# Patient Record
Sex: MALE | Race: BLACK OR AFRICAN AMERICAN | Employment: OTHER | URBAN - METROPOLITAN AREA
[De-identification: names, ages, dates, MRNs, and addresses within clinical notes are randomized per-mention and may not be internally consistent; named-entity substitution may affect disease eponyms.]

---

## 2020-07-04 ENCOUNTER — HOSPITAL ENCOUNTER (INPATIENT)
Age: 80
LOS: 9 days | Discharge: HOSPICE | DRG: 180 | End: 2020-07-13
Attending: INTERNAL MEDICINE | Admitting: INTERNAL MEDICINE
Payer: MEDICARE

## 2020-07-04 DIAGNOSIS — C34.92 METASTATIC PRIMARY LUNG CANCER, LEFT (HCC): ICD-10-CM

## 2020-07-04 DIAGNOSIS — R91.8 LUNG MASS: Primary | ICD-10-CM

## 2020-07-04 PROBLEM — J18.9 PNEUMONIA: Status: ACTIVE | Noted: 2020-07-04

## 2020-07-04 PROCEDURE — 65660000000 HC RM CCU STEPDOWN

## 2020-07-04 NOTE — PROGRESS NOTES
TRANSFER - IN REPORT:    Verbal report received from [facility] (name) on Luz Marina Fountain  being received from [address] [place] [address](unit) for routine progression of care      Report consisted of patients Situation, Background, Assessment and   Recommendations(SBAR). Information from the following report(s) SBAR was reviewed with the receiving nurse. Opportunity for questions and clarification was provided. Assessment completed upon patients arrival to unit and care assumed.

## 2020-07-05 LAB
ERYTHROCYTE [DISTWIDTH] IN BLOOD BY AUTOMATED COUNT: 16.7 % (ref 11.5–14.5)
HCT VFR BLD AUTO: 37.8 % (ref 36.6–50.3)
HGB BLD-MCNC: 11.5 G/DL (ref 12.1–17)
LACTATE SERPL-SCNC: 3 MMOL/L (ref 0.4–2)
MCH RBC QN AUTO: 26.6 PG (ref 26–34)
MCHC RBC AUTO-ENTMCNC: 30.4 G/DL (ref 30–36.5)
MCV RBC AUTO: 87.5 FL (ref 80–99)
NRBC # BLD: 0 K/UL (ref 0–0.01)
NRBC BLD-RTO: 0 PER 100 WBC
PLATELET # BLD AUTO: 197 K/UL (ref 150–400)
PMV BLD AUTO: 10.8 FL (ref 8.9–12.9)
RBC # BLD AUTO: 4.32 M/UL (ref 4.1–5.7)
TROPONIN I SERPL-MCNC: <0.05 NG/ML
WBC # BLD AUTO: 12.1 K/UL (ref 4.1–11.1)

## 2020-07-05 PROCEDURE — 74011250636 HC RX REV CODE- 250/636: Performed by: INTERNAL MEDICINE

## 2020-07-05 PROCEDURE — 74011000258 HC RX REV CODE- 258: Performed by: INTERNAL MEDICINE

## 2020-07-05 PROCEDURE — 74011250637 HC RX REV CODE- 250/637: Performed by: INTERNAL MEDICINE

## 2020-07-05 PROCEDURE — 77030029684 HC NEB SM VOL KT MONA -A

## 2020-07-05 PROCEDURE — 65660000000 HC RM CCU STEPDOWN

## 2020-07-05 PROCEDURE — 36415 COLL VENOUS BLD VENIPUNCTURE: CPT

## 2020-07-05 PROCEDURE — 83605 ASSAY OF LACTIC ACID: CPT

## 2020-07-05 PROCEDURE — 85027 COMPLETE CBC AUTOMATED: CPT

## 2020-07-05 PROCEDURE — 74011000250 HC RX REV CODE- 250: Performed by: INTERNAL MEDICINE

## 2020-07-05 PROCEDURE — 94640 AIRWAY INHALATION TREATMENT: CPT

## 2020-07-05 PROCEDURE — 84484 ASSAY OF TROPONIN QUANT: CPT

## 2020-07-05 RX ORDER — ALLOPURINOL 100 MG/1
100 TABLET ORAL 2 TIMES DAILY
Status: DISCONTINUED | OUTPATIENT
Start: 2020-07-05 | End: 2020-07-13 | Stop reason: HOSPADM

## 2020-07-05 RX ORDER — SODIUM CHLORIDE 0.9 % (FLUSH) 0.9 %
5-40 SYRINGE (ML) INJECTION EVERY 8 HOURS
Status: DISCONTINUED | OUTPATIENT
Start: 2020-07-05 | End: 2020-07-13 | Stop reason: HOSPADM

## 2020-07-05 RX ORDER — ONDANSETRON 2 MG/ML
4 INJECTION INTRAMUSCULAR; INTRAVENOUS
Status: DISCONTINUED | OUTPATIENT
Start: 2020-07-05 | End: 2020-07-13 | Stop reason: HOSPADM

## 2020-07-05 RX ORDER — MORPHINE SULFATE 2 MG/ML
1 INJECTION, SOLUTION INTRAMUSCULAR; INTRAVENOUS
Status: DISCONTINUED | OUTPATIENT
Start: 2020-07-05 | End: 2020-07-08

## 2020-07-05 RX ORDER — POLYETHYLENE GLYCOL 3350 17 G/17G
17 POWDER, FOR SOLUTION ORAL DAILY PRN
Status: DISCONTINUED | OUTPATIENT
Start: 2020-07-05 | End: 2020-07-13 | Stop reason: HOSPADM

## 2020-07-05 RX ORDER — COLCHICINE 0.6 MG/1
0.6 TABLET ORAL DAILY
Status: DISCONTINUED | OUTPATIENT
Start: 2020-07-05 | End: 2020-07-13 | Stop reason: HOSPADM

## 2020-07-05 RX ORDER — AMLODIPINE BESYLATE 5 MG/1
10 TABLET ORAL DAILY
Status: DISCONTINUED | OUTPATIENT
Start: 2020-07-05 | End: 2020-07-13 | Stop reason: HOSPADM

## 2020-07-05 RX ORDER — VANCOMYCIN 2 GRAM/500 ML IN 0.9 % SODIUM CHLORIDE INTRAVENOUS
2000
Status: COMPLETED | OUTPATIENT
Start: 2020-07-05 | End: 2020-07-05

## 2020-07-05 RX ORDER — FAMOTIDINE 10 MG/ML
20 INJECTION INTRAVENOUS EVERY 12 HOURS
Status: DISCONTINUED | OUTPATIENT
Start: 2020-07-05 | End: 2020-07-13 | Stop reason: HOSPADM

## 2020-07-05 RX ORDER — LISINOPRIL 20 MG/1
20 TABLET ORAL DAILY
Status: DISCONTINUED | OUTPATIENT
Start: 2020-07-05 | End: 2020-07-13 | Stop reason: HOSPADM

## 2020-07-05 RX ORDER — ENOXAPARIN SODIUM 100 MG/ML
40 INJECTION SUBCUTANEOUS DAILY
Status: DISCONTINUED | OUTPATIENT
Start: 2020-07-05 | End: 2020-07-08

## 2020-07-05 RX ORDER — ATENOLOL 25 MG/1
25 TABLET ORAL DAILY
Status: DISCONTINUED | OUTPATIENT
Start: 2020-07-05 | End: 2020-07-13 | Stop reason: HOSPADM

## 2020-07-05 RX ORDER — ATORVASTATIN CALCIUM 10 MG/1
10 TABLET, FILM COATED ORAL DAILY
Status: DISCONTINUED | OUTPATIENT
Start: 2020-07-05 | End: 2020-07-13 | Stop reason: HOSPADM

## 2020-07-05 RX ORDER — ACETAMINOPHEN 325 MG/1
650 TABLET ORAL
Status: DISCONTINUED | OUTPATIENT
Start: 2020-07-05 | End: 2020-07-13 | Stop reason: HOSPADM

## 2020-07-05 RX ORDER — PROMETHAZINE HYDROCHLORIDE 25 MG/1
12.5 TABLET ORAL
Status: DISCONTINUED | OUTPATIENT
Start: 2020-07-05 | End: 2020-07-13 | Stop reason: HOSPADM

## 2020-07-05 RX ORDER — SODIUM CHLORIDE 0.9 % (FLUSH) 0.9 %
5-40 SYRINGE (ML) INJECTION AS NEEDED
Status: DISCONTINUED | OUTPATIENT
Start: 2020-07-05 | End: 2020-07-13 | Stop reason: HOSPADM

## 2020-07-05 RX ORDER — SODIUM CHLORIDE 9 MG/ML
50 INJECTION, SOLUTION INTRAVENOUS CONTINUOUS
Status: DISCONTINUED | OUTPATIENT
Start: 2020-07-05 | End: 2020-07-07

## 2020-07-05 RX ORDER — VANCOMYCIN/0.9 % SOD CHLORIDE 1.5G/250ML
1500 PLASTIC BAG, INJECTION (ML) INTRAVENOUS
Status: DISCONTINUED | OUTPATIENT
Start: 2020-07-05 | End: 2020-07-05

## 2020-07-05 RX ORDER — ACETAMINOPHEN 650 MG/1
650 SUPPOSITORY RECTAL
Status: DISCONTINUED | OUTPATIENT
Start: 2020-07-05 | End: 2020-07-13 | Stop reason: HOSPADM

## 2020-07-05 RX ADMIN — Medication 10 ML: at 16:02

## 2020-07-05 RX ADMIN — VANCOMYCIN HYDROCHLORIDE 2000 MG: 10 INJECTION, POWDER, LYOPHILIZED, FOR SOLUTION INTRAVENOUS at 02:52

## 2020-07-05 RX ADMIN — Medication 10 ML: at 05:05

## 2020-07-05 RX ADMIN — PIPERACILLIN AND TAZOBACTAM 3.38 G: 3; .375 INJECTION, POWDER, LYOPHILIZED, FOR SOLUTION INTRAVENOUS at 17:06

## 2020-07-05 RX ADMIN — AMLODIPINE BESYLATE 10 MG: 5 TABLET ORAL at 11:50

## 2020-07-05 RX ADMIN — MORPHINE SULFATE 1 MG: 2 INJECTION, SOLUTION INTRAMUSCULAR; INTRAVENOUS at 14:13

## 2020-07-05 RX ADMIN — LISINOPRIL 20 MG: 20 TABLET ORAL at 11:50

## 2020-07-05 RX ADMIN — ENOXAPARIN SODIUM 40 MG: 30 INJECTION SUBCUTANEOUS at 08:26

## 2020-07-05 RX ADMIN — ACETAMINOPHEN 650 MG: 325 TABLET ORAL at 08:27

## 2020-07-05 RX ADMIN — ALLOPURINOL 100 MG: 100 TABLET ORAL at 17:06

## 2020-07-05 RX ADMIN — Medication 10 ML: at 21:12

## 2020-07-05 RX ADMIN — PIPERACILLIN AND TAZOBACTAM 3.38 G: 3; .375 INJECTION, POWDER, LYOPHILIZED, FOR SOLUTION INTRAVENOUS at 10:20

## 2020-07-05 RX ADMIN — ATORVASTATIN CALCIUM 10 MG: 10 TABLET, FILM COATED ORAL at 11:49

## 2020-07-05 RX ADMIN — ATENOLOL 25 MG: 25 TABLET ORAL at 11:50

## 2020-07-05 RX ADMIN — PIPERACILLIN AND TAZOBACTAM 3.38 G: 3; .375 INJECTION, POWDER, LYOPHILIZED, FOR SOLUTION INTRAVENOUS at 05:01

## 2020-07-05 RX ADMIN — FAMOTIDINE 20 MG: 10 INJECTION INTRAVENOUS at 21:12

## 2020-07-05 RX ADMIN — COLCHICINE 0.6 MG: 0.6 TABLET ORAL at 11:50

## 2020-07-05 RX ADMIN — METHYLPREDNISOLONE SODIUM SUCCINATE 40 MG: 40 INJECTION, POWDER, FOR SOLUTION INTRAMUSCULAR; INTRAVENOUS at 21:12

## 2020-07-05 RX ADMIN — METHYLPREDNISOLONE SODIUM SUCCINATE 40 MG: 40 INJECTION, POWDER, FOR SOLUTION INTRAMUSCULAR; INTRAVENOUS at 11:50

## 2020-07-05 RX ADMIN — Medication 10 ML: at 01:32

## 2020-07-05 RX ADMIN — ALLOPURINOL 100 MG: 100 TABLET ORAL at 11:50

## 2020-07-05 RX ADMIN — FAMOTIDINE 20 MG: 10 INJECTION INTRAVENOUS at 08:27

## 2020-07-05 RX ADMIN — SODIUM CHLORIDE 50 ML/HR: 900 INJECTION, SOLUTION INTRAVENOUS at 11:00

## 2020-07-05 RX ADMIN — ACETAMINOPHEN 650 MG: 325 TABLET ORAL at 01:29

## 2020-07-05 RX ADMIN — ARFORMOTEROL TARTRATE: 15 SOLUTION RESPIRATORY (INHALATION) at 19:15

## 2020-07-05 NOTE — H&P
Hospitalist Admission Note    NAME: Jonathan Nagel   :  1940   MRN:  [id number]     Date/Time:  2020 11:11 PM    Patient PCP: Tawanna Costello  ______________________________________________________________________  Given the patient's current clinical presentation, I have a high level of concern for decompensation if discharged from the emergency department. Complex decision making was performed, which includes reviewing the patient's available past medical records, laboratory results, and x-ray films. My assessment of this patient's clinical condition and my plan of care is as follows.     Assessment / Plan:  Worsening shortness of breath secondary to left pleural effusion and left lung mass and superimposed pneumonia   Admit patient to stepdown  Oxygen supplement  start patient on IV antibiotic Zosyn and vancomycin   IR consultation for thoracocentesis  Patient stated he has lung biopsy follow-up pathology report    Hyperlipidemia   Continue Lipitor    Hypertension  continue home antihypertensive medication    CAD  continue aspirin    History of emphysema  Nebulizer as needed          Code Status: Full  Surrogate Decision Maker:    DVT Prophylaxis: Lovenox   GI Prophylaxis: not indicated          Subjective:   CHIEF COMPLAINT: SOB     HISTORY OF PRESENT ILLNESS:     79years old male with past medical history significant for emphysema, hypertension, coronary artery disease was transferred from [facility] ED for evaluation of worsening shortness of breath, patient was recently diagnosed with lung mass and had lung biopsy recently, patient stated his shortness of breath worsening and he feels shortness of breath with minimal exertion, patient denies any fever any chills denies any chest pain, blood work in ED was significant for elevated white blood cell count 13.0, lactic acid was noticed to be elevated 3.6, CTA chest was done and show an increase large left chest lung mass and increased left pleural effusion. We were asked to admit for work up and evaluation of the above problems. past medical history on file. Emphysema , HTN      past surgical history on file. Lung biopsy     Social History     Tobacco Use    Smoking status: Not on file   Substance Use Topics    Alcohol use: Not on file         family history on file. HTN   Allergies   Allergen Reactions    Bupropion Not Reported This Time        Prior to Admission medications    Medication Sig Start Date End Date Taking? Authorizing Provider   albuterol (Ventolin HFA) 90 mcg/actuation inhaler Take 2 Puffs by inhalation every four (4) hours as needed. 9/6/19 9/5/20  Other, MD Roberto   allopurinoL (ZYLOPRIM) 100 mg tablet Take 100 mg by mouth two (2) times a day. 9/6/19   Roberto Siegel MD   amLODIPine (NORVASC) 10 mg tablet TAKE 1 TABLET EVERY DAY 11/6/19   Roberto Siegel MD   aspirin-calcium carbonate 81 mg-300 mg calcium(777 mg) tab Take 81 mg by mouth daily. 4/17/17   Roberto Siegel MD   atenoloL-chlorthalidone (TENORETIC) 100-25 mg per tablet Take 1 Tab by mouth daily. 9/6/19 9/5/20  Roberto Siegel MD   atorvastatin (LIPITOR) 10 mg tablet Take 10 mg by mouth daily. 9/6/19   Roberto Siegel MD   Bifidobacterium Infantis (Align) 4 mg cap Take 1 Cap by mouth. 5/29/20   Roberto Siegel MD   budesonide-formoteroL (SYMBICORT) 160-4.5 mcg/actuation HFAA Take 2 Puffs by inhalation two (2) times a day. 5/22/20 5/22/21  Roberto Siegel MD   colchicine 0.6 mg tablet TAKE 1 TABLET EVERY DAY 11/6/19   Roberto Siegel MD   diclofenac EC (VOLTAREN) 75 mg EC tablet Take 75 mg by mouth two (2) times daily as needed. 6/12/20 8/11/20  Roberto Siegel MD   docusate sodium (COLACE) 100 mg capsule Take 100 mg by mouth two (2) times a day. 4/22/20 4/22/21  Roberto Siegel MD   escitalopram oxalate (LEXAPRO) 10 mg tablet TAKE 1 TABLET EVERY DAY 11/6/19   Roberto Siegel MD   albuterol-ipratropium (DUO-NEB) 2.5 mg-0.5 mg/3 ml nebu Take 3 mL by inhalation.  5/22/20 5/22/21 Other, MD Roberto   lisinopriL (PRINIVIL, ZESTRIL) 20 mg tablet TAKE 1 TABLET EVERY DAY 11/6/19   Roberto Siegel MD   nitroglycerin (NITROSTAT) 0.4 mg SL tablet DISSOLVE 1 TABLET UNDER THE TONGUE AS NEEDED 10/17/19   Roberto Siegel MD   omega-3 acid ethyl esters (LOVAZA) 1 gram capsule Take 2 g by mouth two (2) times a day. 12/7/18   Roberto Siegel MD   potassium chloride SA (MICRO-K) 10 mEq capsule Take 20 mEq by mouth daily. 9/6/19 9/5/20  Roberto Siegel MD   tadalafiL (Cialis) 10 mg tablet Take 10 mg by mouth as needed. 3/30/17   Roberto Siegel MD   tiotropium (SPIRIVA) 18 mcg inhalation capsule Take 1 Cap by inhalation daily. 6/3/20 6/3/21  Roberto Siegel MD       REVIEW OF SYSTEMS:     I am not able to complete the review of systems because:    The patient is intubated and sedated    The patient has altered mental status due to his acute medical problems    The patient has baseline aphasia from prior stroke(s)    The patient has baseline dementia and is not reliable historian    The patient is in acute medical distress and unable to provide information           Total of 12 systems reviewed as follows:       POSITIVE= underlined text  Negative = text not underlined  General:  fever, chills, sweats, generalized weakness, weight loss/gain,      loss of appetite   Eyes:    blurred vision, eye pain, loss of vision, double vision  ENT:    rhinorrhea, pharyngitis   Respiratory:   cough, sputum production, SOB, HORN, wheezing, pleuritic pain   Cardiology:   chest pain, palpitations, orthopnea, PND, edema, syncope   Gastrointestinal:  abdominal pain , N/V, diarrhea, dysphagia, constipation, bleeding   Genitourinary:  frequency, urgency, dysuria, hematuria, incontinence   Muskuloskeletal :  arthralgia, myalgia, back pain  Hematology:  easy bruising, nose or gum bleeding, lymphadenopathy   Dermatological: rash, ulceration, pruritis, color change / jaundice  Endocrine:   hot flashes or polydipsia   Neurological:  headache, dizziness, confusion, focal weakness, paresthesia,     Speech difficulties, memory loss, gait difficulty  Psychological: Feelings of anxiety, depression, agitation    Objective:   VITALS:    Visit Vitals  /81 (BP 1 Location: Left arm, BP Patient Position: At rest)   Pulse 99   Temp 97.3 °F (36.3 °C)   Resp 20   Wt 90.7 kg (199 lb 15.3 oz)   SpO2 93%       PHYSICAL EXAM:    General:    Alert, cooperative, no distress, appears stated age. HEENT: Atraumatic, anicteric sclerae, pink conjunctivae     No oral ulcers, mucosa moist, throat clear, dentition fair  Neck:  Supple, symmetrical,  thyroid: non tender  Lungs:   Diminished breath sound   Chest wall:  No tenderness  No Accessory muscle use. Heart:   Regular  rhythm,  No  murmur   No edema  Abdomen:   Soft, non-tender. Not distended. Bowel sounds normal  Extremities: No cyanosis. No clubbing,      Skin turgor normal, Capillary refill normal, Radial dial pulse 2+  Skin:     Not pale. Not Jaundiced  No rashes   Psych:  Good insight. Not depressed. Not anxious or agitated. Neurologic: EOMs intact. No facial asymmetry. No aphasia or slurred speech. Symmetrical strength, Sensation grossly intact.  Alert and oriented X 4.     _______________________________________________________________________  Care Plan discussed with:    Comments   Patient y    Family      RN y    Care Manager                    Consultant:      _______________________________________________________________________  Expected  Disposition:   Home with Family y   HH/PT/OT/RN    SNF/LTC    IVELKA    ________________________________________________________________________  TOTAL TIME:  61  Minutes    Critical Care Provided     Minutes non procedure based      Comments    y Reviewed previous records   >50% of visit spent in counseling and coordination of care y Discussion with patient and/or family and questions answered ________________________________________________________________________  Signed: Satinder Laureano MD    Procedures: see electronic medical records for all procedures/Xrays and details which were not copied into this note but were reviewed prior to creation of Plan. LAB DATA REVIEWED:    Recent Results (from the past 24 hour(s))   EKG, 12 LEAD, INITIAL    Collection Time: 07/04/20  2:59 PM   Result Value Ref Range    Ventricular Rate 115 BPM    Atrial Rate 115 BPM    P-R Interval 128 ms    QRS Duration 162 ms    Q-T Interval 420 ms    QTC Calculation (Bezet) 581 ms    Calculated P Axis 71 degrees    Calculated R Axis -102 degrees    Calculated T Axis 72 degrees    Diagnosis       Atrial-sensed ventricular-paced rhythm  Abnormal ECG  No previous ECGs available  Confirmed by Machelle Retana MD, --- ([id number]) on 7/4/2020 5:44:39 PM     CBC WITH AUTOMATED DIFF    Collection Time: 07/04/20  3:45 PM   Result Value Ref Range    WBC 13.0 (H) 4.1 - 11.1 K/uL    RBC 4.29 4. 10 - 5.70 M/uL    HGB 11.6 (L) 12.1 - 17.0 g/dL    HCT 36.8 36.6 - 50.3 %    MCV 85.8 80.0 - 99.0 FL    MCH 27.0 26.0 - 34.0 PG    MCHC 31.5 30.0 - 36.5 g/dL    RDW 16.4 (H) 11.5 - 14.5 %    PLATELET 556 489 - 323 K/uL    MPV 10.7 8.9 - 12.9 FL    NRBC 0.0 0  WBC    ABSOLUTE NRBC 0.00 0.00 - 0.01 K/uL    NEUTROPHILS 76 (H) 32 - 75 %    LYMPHOCYTES 10 (L) 12 - 49 %    MONOCYTES 11 5 - 13 %    EOSINOPHILS 2 0 - 7 %    BASOPHILS 0 0 - 1 %    IMMATURE GRANULOCYTES 1 (H) 0.0 - 0.5 %    ABS. NEUTROPHILS 9.9 (H) 1.8 - 8.0 K/UL    ABS. LYMPHOCYTES 1.3 0.8 - 3.5 K/UL    ABS. MONOCYTES 1.4 (H) 0.0 - 1.0 K/UL    ABS. EOSINOPHILS 0.3 0.0 - 0.4 K/UL    ABS. BASOPHILS 0.1 0.0 - 0.1 K/UL    ABS. IMM.  GRANS. 0.1 (H) 0.00 - 0.04 K/UL    DF AUTOMATED     PROTHROMBIN TIME + INR    Collection Time: 07/04/20  3:45 PM   Result Value Ref Range    INR 1.3 (H) 0.9 - 1.1      Prothrombin time 12.4 (H) 9.0 - 24.4 sec   METABOLIC PANEL, COMPREHENSIVE    Collection Time: 07/04/20  3:45 PM   Result Value Ref Range    Sodium 142 136 - 145 mmol/L    Potassium 5.1 3.5 - 5.1 mmol/L    Chloride 106 97 - 108 mmol/L    CO2 26 21 - 32 mmol/L    Anion gap 10 5 - 15 mmol/L    Glucose 114 (H) 65 - 100 mg/dL    BUN 18 6 - 20 MG/DL    Creatinine 1.14 0.70 - 1.30 MG/DL    BUN/Creatinine ratio 16 12 - 20      GFR est AA >60 >60 ml/min/1.73m2    GFR est non-AA >60 >60 ml/min/1.73m2    Calcium 10.3 (H) 8.5 - 10.1 MG/DL    Bilirubin, total 0.4 0.2 - 1.0 MG/DL    ALT (SGPT) 19 12 - 78 U/L    AST (SGOT) 27 15 - 37 U/L    Alk. phosphatase 88 45 - 117 U/L    Protein, total 6.9 6.4 - 8.2 g/dL    Albumin 2.1 (L) 3.5 - 5.0 g/dL    Globulin 4.8 (H) 2.0 - 4.0 g/dL    A-G Ratio 0.4 (L) 1.1 - 2.2     LIPASE    Collection Time: 07/04/20  3:45 PM   Result Value Ref Range    Lipase 39 (L) 73 - 393 U/L   TROPONIN I    Collection Time: 07/04/20  3:45 PM   Result Value Ref Range    Troponin-I, Qt. <0.05 <0.05 ng/mL   LACTIC ACID    Collection Time: 07/04/20  3:45 PM   Result Value Ref Range    Lactic acid 3.6 (HH) 0.4 - 2.0 MMOL/L   SAMPLES BEING HELD    Collection Time: 07/04/20  3:45 PM   Result Value Ref Range    SAMPLES BEING HELD 1SST, 1RED     COMMENT        Add-on orders for these samples will be processed based on acceptable specimen integrity and analyte stability, which may vary by analyte.    LACTIC ACID    Collection Time: 07/04/20  6:01 PM   Result Value Ref Range    Lactic acid 2.8 (HH) 0.4 - 2.0 MMOL/L   URINALYSIS W/ RFLX MICROSCOPIC    Collection Time: 07/04/20  7:58 PM   Result Value Ref Range    Color YELLOW/STRAW      Appearance CLEAR CLEAR      Specific gravity 1.015 1.003 - 1.030      pH (UA) 5.0 5.0 - 8.0      Protein Negative NEG mg/dL    Glucose Negative NEG mg/dL    Ketone Negative NEG mg/dL    Bilirubin Negative NEG      Blood Negative NEG      Urobilinogen 0.2 0.2 - 1.0 EU/dL    Nitrites Negative NEG      Leukocyte Esterase Negative NEG

## 2020-07-05 NOTE — PROGRESS NOTES
Pharmacy Automatic Renal Dosing Protocol - Antimicrobials    Indication for Antimicrobials: VAP     Current Regimen of Each Antimicrobial:  Zosyn 3.375 gram iv q8h,  Vancomycin pharmacy to dose,      Previous Antimicrobial Therapy:      Goal Level: VANCOMYCIN TROUGH GOAL RANGE    Vancomycin Trough: 15 - 20 mcg/mL  (AUC: 400 - 600 mg/hr/Liter/day)     Date Dose & Interval Measured (mcg/mL) Extrapolated (mcg/mL)                       Date & time of next level:     Significant Cultures: Urine cultures ,  Blood cultures       Radiology / Imaging results: (X-ray, CT scan or MRI):     Paralysis, amputations, malnutrition:     Labs:  Recent Labs     20  0140 20  1545   CREA  --  1.14   BUN  --  18   WBC 12.1* 13.0*     Temp (24hrs), Av.9 °F (36.6 °C), Min:97.3 °F (36.3 °C), Max:98.4 °F (36.9 °C)    Creatinine Clearance (mL/min) or Dialysis: 61.1    Impression/Plan:   Zosyn dosing appropriate,  Vancomycin dosing started at 2000 mg now and then 1500 mg iv q16h to achieve an estimated peak/trough of 39.2 and 17.11. Antimicrobial stop date      Pharmacy will follow daily and adjust medications as appropriate for renal function and/or serum levels. Thank you,  Kaden Aguilera, PHARMD    Recommended duration of therapy  [email or web address]/Pharmacy/Clinical%20Companion/[email or web address]. docx    Renal Dosing  [email or web address]/[place]/[facility]/Pharmacy/Clinical%20Companion/Renal%[email or web address]. pdf

## 2020-07-05 NOTE — ROUTINE PROCESS
ADULT PROTOCOL: JET AEROSOL ASSESSMENT Patient  Yolanda Whiting     79 y.o.   male     7/5/2020  7:35 PM 
 
Breath Sounds Pre Procedure: Right Breath Sounds: Diminished Left Breath Sounds: Diminished Breath Sounds Post Procedure: Right Breath Sounds: Diminished Left Breath Sounds: Diminished Breathing pattern: Pre procedure Breathing Pattern: Regular Post procedure Breathing Pattern: Regular Heart Rate: Pre procedure Pulse: 79 Post procedure Pulse: 82 Resp Rate: Pre procedure Respirations: 18 Post procedure Respirations: 18 
 
Cough: Pre procedure Cough: Non-productive Post procedure Cough: Non-productive Suctioned: NO Oxygen: O2 Device: Room air Changed: NO SpO2: Pre procedure SpO2: 93 %   without oxygen Post procedure SpO2: 94 %  without oxygen Nebulizer Therapy: Current medications Aerosolized Medications: Brovana, Pulmicort Changed: NO Smoking History: not on file. Problem List:  
Patient Active Problem List  
Diagnosis Code  Lung mass R91.8  Pneumonia J18.9 Respiratory Therapist: RT Uriel

## 2020-07-05 NOTE — PROGRESS NOTES
Patient arrive via EMS from [facility]. Alert and oriented x3. VSS. Awaing on MD to see patient.     German Serrano RN

## 2020-07-05 NOTE — ACP (ADVANCE CARE PLANNING)
.                                           Advance Care Planning Note      NAME: Quinton Ram   :  1940   MRN:  [id number]     Date/Time:  2020 10:25 AM    Active Diagnoses:  Hospital Problems  Never Reviewed          Codes Class Noted POA    Lung mass ICD-10-CM: R91.8  ICD-9-CM: 786.6  2020 Unknown        Pneumonia ICD-10-CM: J18.9  ICD-9-CM: 626  2020 Unknown              These active diagnoses are of sufficient risk that focused discussion on advance care planning is indicated in order to allow the patient to thoughtfully consider personal goals of care, and if situations arise that prevent the ability to personally give input, to ensure appropriate representation of their personal desires for different levels and aggressiveness of care. Discussion:   Code status addressed and wants to be a DNR / DNI. Patient wants central line and vasopressors if needed. Patient  would like to assign [facility] [place] [place] Grace(friend, caregiver/)  as the surrogate decision maker. Persons present and participating in discussion: Indiana Duran MD, Discussed with [facility] [place] [place] over phone      Time Spent:   Total time spent face-to-face in education and discussion:   17  minutes.          Indiana Mckeon MD   Hospitalist

## 2020-07-05 NOTE — PROGRESS NOTES
Hospitalist Progress Note    NAME: Chrystal Rasmussen   :  1940   MRN:  [id number]       Assessment / Plan:  Malignant left pleural effusion POA  Left lung mass 17x15 cm  Concern for left CAP    -Recently diagnosed high-grade malignant neoplasm   -CT chest on admission, showed slightly increase in size of left lung mass, invades the mediastinum and narrows the pulmonary artery on the left and it displaces the mediastinum to the right.  -His CT chest was evaluated by cardiothoracic surgeon Dr. Joaquin Nunez, no surgical resection recommended due to extensive involvement  -Has a first appointment with hematology on .    -Patient admitted with worsening of shortness of breath, fatigue.    -Plan for IR guided thoracentesis tomorrow  -Continue current antibiotics, doubt pneumonia  -Nebulizer  -Low-dose of steroids  -Oxygen supplement if needed    Hypercalcemia, Mild  Corrected calcium 11.8  Hold Chlorthalidone  gentle hydration, repeat tomorrow        History of CAD status post CABG     Hyperlipidemia   Continue Lipitor     Hypertension  continue home antihypertensive medication     CAD  continue aspirin     History of emphysema  Nebulizer as needed      Pt/OT- may need rehab        Code Status:  DNR  Surrogate Decision Maker: Friend/caregiver/ Maureen Carrizales-  -discussed with patient, he states that he lives with her for past 30 years, and he wants to appoint her as a mPOA       DVT Prophylaxis: Lovenox   GI Prophylaxis: not indicated        Subjective:     Chief Complaint / Reason for Physician Visit  \"feels same as yesterday\". Discussed with RN events overnight.      Review of Systems:  Symptom Y/N Comments  Symptom Y/N Comments   Fever/Chills n   Chest Pain n    Poor Appetite n   Edema n    Cough y   Abdominal Pain y    Sputum n   Joint Pain     SOB/HORN y   Pruritis/Rash     Nausea/vomit n   Tolerating PT/OT     Diarrhea    Tolerating Diet     Constipation    Other       Could NOT obtain due to:      Objective:     VITALS:   Last 24hrs VS reviewed since prior progress note. Most recent are:  Patient Vitals for the past 24 hrs:   Temp Pulse Resp BP SpO2   07/05/20 0754 97.8 °F (36.6 °C) 97 16 127/87 92 %   07/05/20 0401 97.6 °F (36.4 °C) 99 20 108/75 94 %   07/04/20 2155 97.3 °F (36.3 °C) 99 20 135/81 93 %       Intake/Output Summary (Last 24 hours) at 7/5/2020 5387  Last data filed at 7/4/2020 2346  Gross per 24 hour   Intake    Output 200 ml   Net -200 ml        PHYSICAL EXAM:  General: WD, WN. Alert, cooperative, no acute distress    EENT:  EOMI. Anicteric sclerae. MMM  Resp:  B/l poor air entry  CV:  Regular  rhythm,  No edema  GI:  Soft, Non distended, Non tender.  +Bowel sounds  Neurologic:  Alert and oriented X 3, normal speech,   Psych:   Good insight. Not anxious nor agitated  Skin:  No rashes. No jaundice    Reviewed most current lab test results and cultures  YES  Reviewed most current radiology test results   YES  Review and summation of old records today    NO  Reviewed patient's current orders and MAR    YES  PMH/SH reviewed - no change compared to H&P  ________________________________________________________________________  Care Plan discussed with:    Comments   Patient x    Family      RN x    Care Manager     Consultant                        Multidiciplinary team rounds were held today with , nursing, pharmacist and clinical coordinator. Patient's plan of care was discussed; medications were reviewed and discharge planning was addressed.      ________________________________________________________________________  Total NON critical care TIME:  30   Minutes    Total CRITICAL CARE TIME Spent:   Minutes non procedure based      Comments   >50% of visit spent in counseling and coordination of care     ________________________________________________________________________  Faythe Frankel, MD     Procedures: see electronic medical records for all procedures/Xrays and details which were not copied into this note but were reviewed prior to creation of Plan. LABS:  I reviewed today's most current labs and imaging studies.   Pertinent labs include:  Recent Labs     07/05/20  0140 07/04/20  1545   WBC 12.1* 13.0*   HGB 11.5* 11.6*   HCT 37.8 36.8    211     Recent Labs     07/04/20  1545      K 5.1      CO2 26   *   BUN 18   CREA 1.14   CA 10.3*   ALB 2.1*   TBILI 0.4   ALT 19   INR 1.3*       Signed: Yesica Antonio MD

## 2020-07-06 ENCOUNTER — APPOINTMENT (OUTPATIENT)
Dept: GENERAL RADIOLOGY | Age: 80
DRG: 180 | End: 2020-07-06
Attending: RADIOLOGY
Payer: MEDICARE

## 2020-07-06 ENCOUNTER — APPOINTMENT (OUTPATIENT)
Dept: ULTRASOUND IMAGING | Age: 80
DRG: 180 | End: 2020-07-06
Attending: INTERNAL MEDICINE
Payer: MEDICARE

## 2020-07-06 LAB
ALBUMIN SERPL-MCNC: 2 G/DL (ref 3.5–5)
ALBUMIN/GLOB SERPL: 0.4 {RATIO} (ref 1.1–2.2)
ALP SERPL-CCNC: 90 U/L (ref 45–117)
ALT SERPL-CCNC: 16 U/L (ref 12–78)
ANION GAP SERPL CALC-SCNC: 7 MMOL/L (ref 5–15)
AST SERPL-CCNC: 19 U/L (ref 15–37)
BILIRUB DIRECT SERPL-MCNC: 0.2 MG/DL (ref 0–0.2)
BILIRUB SERPL-MCNC: 0.7 MG/DL (ref 0.2–1)
BUN SERPL-MCNC: 17 MG/DL (ref 6–20)
BUN/CREAT SERPL: 18 (ref 12–20)
CALCIUM SERPL-MCNC: 10 MG/DL (ref 8.5–10.1)
CHLORIDE SERPL-SCNC: 109 MMOL/L (ref 97–108)
CO2 SERPL-SCNC: 23 MMOL/L (ref 21–32)
CREAT SERPL-MCNC: 0.97 MG/DL (ref 0.7–1.3)
ERYTHROCYTE [DISTWIDTH] IN BLOOD BY AUTOMATED COUNT: 16.7 % (ref 11.5–14.5)
GLOBULIN SER CALC-MCNC: 4.9 G/DL (ref 2–4)
GLUCOSE SERPL-MCNC: 116 MG/DL (ref 65–100)
HCT VFR BLD AUTO: 40.1 % (ref 36.6–50.3)
HGB BLD-MCNC: 12 G/DL (ref 12.1–17)
LACTATE SERPL-SCNC: 4.2 MMOL/L (ref 0.4–2)
LACTATE SERPL-SCNC: 4.3 MMOL/L (ref 0.4–2)
MCH RBC QN AUTO: 26.6 PG (ref 26–34)
MCHC RBC AUTO-ENTMCNC: 29.9 G/DL (ref 30–36.5)
MCV RBC AUTO: 88.9 FL (ref 80–99)
NRBC # BLD: 0 K/UL (ref 0–0.01)
NRBC BLD-RTO: 0 PER 100 WBC
PLATELET # BLD AUTO: 208 K/UL (ref 150–400)
PMV BLD AUTO: 11.2 FL (ref 8.9–12.9)
POTASSIUM SERPL-SCNC: 5.1 MMOL/L (ref 3.5–5.1)
PROCALCITONIN SERPL-MCNC: 0.29 NG/ML
PROT SERPL-MCNC: 6.9 G/DL (ref 6.4–8.2)
RBC # BLD AUTO: 4.51 M/UL (ref 4.1–5.7)
SODIUM SERPL-SCNC: 139 MMOL/L (ref 136–145)
WBC # BLD AUTO: 14 K/UL (ref 4.1–11.1)

## 2020-07-06 PROCEDURE — 97165 OT EVAL LOW COMPLEX 30 MIN: CPT | Performed by: OCCUPATIONAL THERAPIST

## 2020-07-06 PROCEDURE — 74011250636 HC RX REV CODE- 250/636: Performed by: INTERNAL MEDICINE

## 2020-07-06 PROCEDURE — 83605 ASSAY OF LACTIC ACID: CPT

## 2020-07-06 PROCEDURE — 65660000000 HC RM CCU STEPDOWN

## 2020-07-06 PROCEDURE — 94640 AIRWAY INHALATION TREATMENT: CPT

## 2020-07-06 PROCEDURE — 71045 X-RAY EXAM CHEST 1 VIEW: CPT

## 2020-07-06 PROCEDURE — 74011250637 HC RX REV CODE- 250/637: Performed by: INTERNAL MEDICINE

## 2020-07-06 PROCEDURE — 32555 ASPIRATE PLEURA W/ IMAGING: CPT

## 2020-07-06 PROCEDURE — 80048 BASIC METABOLIC PNL TOTAL CA: CPT

## 2020-07-06 PROCEDURE — 36415 COLL VENOUS BLD VENIPUNCTURE: CPT

## 2020-07-06 PROCEDURE — 97116 GAIT TRAINING THERAPY: CPT

## 2020-07-06 PROCEDURE — 74011000258 HC RX REV CODE- 258: Performed by: INTERNAL MEDICINE

## 2020-07-06 PROCEDURE — 74011000250 HC RX REV CODE- 250: Performed by: INTERNAL MEDICINE

## 2020-07-06 PROCEDURE — 85027 COMPLETE CBC AUTOMATED: CPT

## 2020-07-06 PROCEDURE — 97530 THERAPEUTIC ACTIVITIES: CPT | Performed by: OCCUPATIONAL THERAPIST

## 2020-07-06 PROCEDURE — 80076 HEPATIC FUNCTION PANEL: CPT

## 2020-07-06 PROCEDURE — 84145 PROCALCITONIN (PCT): CPT

## 2020-07-06 PROCEDURE — 97161 PT EVAL LOW COMPLEX 20 MIN: CPT

## 2020-07-06 RX ADMIN — ATENOLOL 25 MG: 25 TABLET ORAL at 10:42

## 2020-07-06 RX ADMIN — ARFORMOTEROL TARTRATE: 15 SOLUTION RESPIRATORY (INHALATION) at 20:00

## 2020-07-06 RX ADMIN — LISINOPRIL 20 MG: 20 TABLET ORAL at 10:42

## 2020-07-06 RX ADMIN — ATORVASTATIN CALCIUM 10 MG: 10 TABLET, FILM COATED ORAL at 10:42

## 2020-07-06 RX ADMIN — Medication 10 ML: at 06:32

## 2020-07-06 RX ADMIN — ACETAMINOPHEN 650 MG: 325 TABLET ORAL at 22:00

## 2020-07-06 RX ADMIN — FAMOTIDINE 20 MG: 10 INJECTION INTRAVENOUS at 10:51

## 2020-07-06 RX ADMIN — ACETAMINOPHEN 650 MG: 325 TABLET ORAL at 15:36

## 2020-07-06 RX ADMIN — PIPERACILLIN AND TAZOBACTAM 3.38 G: 3; .375 INJECTION, POWDER, LYOPHILIZED, FOR SOLUTION INTRAVENOUS at 02:15

## 2020-07-06 RX ADMIN — METHYLPREDNISOLONE SODIUM SUCCINATE 40 MG: 40 INJECTION, POWDER, FOR SOLUTION INTRAMUSCULAR; INTRAVENOUS at 10:47

## 2020-07-06 RX ADMIN — SODIUM CHLORIDE 50 ML/HR: 900 INJECTION, SOLUTION INTRAVENOUS at 06:36

## 2020-07-06 RX ADMIN — ALLOPURINOL 100 MG: 100 TABLET ORAL at 18:35

## 2020-07-06 RX ADMIN — COLCHICINE 0.6 MG: 0.6 TABLET ORAL at 15:36

## 2020-07-06 RX ADMIN — ALLOPURINOL 100 MG: 100 TABLET ORAL at 10:42

## 2020-07-06 RX ADMIN — PIPERACILLIN AND TAZOBACTAM 3.38 G: 3; .375 INJECTION, POWDER, LYOPHILIZED, FOR SOLUTION INTRAVENOUS at 19:41

## 2020-07-06 RX ADMIN — PIPERACILLIN AND TAZOBACTAM 3.38 G: 3; .375 INJECTION, POWDER, LYOPHILIZED, FOR SOLUTION INTRAVENOUS at 12:00

## 2020-07-06 RX ADMIN — METHYLPREDNISOLONE SODIUM SUCCINATE 40 MG: 40 INJECTION, POWDER, FOR SOLUTION INTRAMUSCULAR; INTRAVENOUS at 22:00

## 2020-07-06 RX ADMIN — FAMOTIDINE 20 MG: 10 INJECTION INTRAVENOUS at 22:00

## 2020-07-06 RX ADMIN — ARFORMOTEROL TARTRATE: 15 SOLUTION RESPIRATORY (INHALATION) at 07:30

## 2020-07-06 RX ADMIN — Medication 10 ML: at 22:00

## 2020-07-06 RX ADMIN — AMLODIPINE BESYLATE 10 MG: 5 TABLET ORAL at 10:42

## 2020-07-06 NOTE — PROGRESS NOTES
Problem: Self Care Deficits Care Plan (Adult)  Goal: *Acute Goals and Plan of Care (Insert Text)  Description: FUNCTIONAL STATUS PRIOR TO ADMISSION: limited by SOB and was only walking short distances in the home, performed all ADLS on his own and wife performs IADLS, ambulated with SPC at times    [address]: The patient lived with wife to provide assist as needed. Occupational Therapy Goals:  Initiated 7/6/2020  1. Patient will perform grooming standing with rest breaks as needed with modified independence within 7 days. 2. Patient will perform toileting with modified independence within 7 days. 3. Patient will perform lower body dressing with modified independence within 7 days. 4. Patient will transfer from toilet with modified independence using the least restrictive device and appropriate durable medical equipment within 7 days. Outcome: Progressing Towards Goal   OCCUPATIONAL THERAPY EVALUATION  Patient: Renee Ramirez (61 y.o. male)  Date: 7/6/2020  Primary Diagnosis: Pneumonia [J18.9]  Lung mass [R91.8]        Precautions:   DNR    ASSESSMENT  Based on the objective data described below, the patient presents with stable O2 sat on room air (93% at rest and 95% with activity) with SOB on exertion and decreased activity tolerance. Pt reported a recent decline in endurance due to SOB. He reports having good support from his wife. Pt is being worked up for lung cancer. Pt was seen after thoracentesis today. He presents with general weakness. RW needed for balance and energy conservation today. Pt is very [place] but was able to follow commands.      Current Level of Function Impacting Discharge (ADLs/self-care): supervision bed mobility, CGA sit to stand and for short distance transfer with RW  ADL Assessment:  Feeding: Independent    Oral Facial Hygiene/Grooming: Setup(seated)    Bathing: Minimum assistance    Upper Body Dressing: Setup    Lower Body Dressing: Minimum assistance    Toileting: Minimum assistance    Functional Outcome Measure: The patient scored 60/100 on the barthel outcome measure which is indicative of minimal deficits with mobility and ADLS. Other factors to consider for discharge: none     Patient will benefit from skilled therapy intervention to address the above noted impairments. PLAN :  Recommendations and Planned Interventions: self care training, functional mobility training, therapeutic exercise, balance training, therapeutic activities, patient education, home safety training, and family training/education    Frequency/Duration: Patient will be followed by occupational therapy 4 times a week to address goals. Recommendation for discharge: (in order for the patient to meet his/her long term goals)  Occupational therapy at least 2 days/week in the home AND ensure assist and/or supervision for safety with ADLs     This discharge recommendation:  Has been made in collaboration with the attending provider and/or case management    IF patient discharges home will need the following DME: shower chair and walker: rolling       SUBJECTIVE:   Patient stated I have been getting really short of breath recently.     OBJECTIVE DATA SUMMARY:   HISTORY:   No past medical history on file. No past surgical history on file.     Expanded or extensive additional review of patient history:     Home Situation  Home Environment: Private residence(double wide)  # Steps to Enter: 7  One/Two Story Residence: One story  Living Alone: No(son lives with him)  Support Systems: Child(jermain)  Patient Expects to be Discharged to[de-identified] Private residence  Current DME Used/Available at Home: kelly Larios(at home)    Hand dominance: Right    EXAMINATION OF PERFORMANCE DEFICITS:  Cognitive/Behavioral Status:  Neurologic State: Alert  Orientation Level: Oriented X4  Cognition: Follows commands([place])  Perception: Appears intact  Perseveration: No perseveration noted  Safety/Judgement: Awareness of environment; Fall prevention    Hearing: Auditory  Auditory Impairment: Hard of hearing, bilateral    Vision/Perceptual:                                Corrective Lenses: Reading glasses    Range of Motion:    AROM: Generally decreased, functional  PROM: Within functional limits                      Strength:    Strength: Generally decreased, functional                Coordination:  Coordination: Within functional limits  Fine Motor Skills-Upper: Left Intact; Right Intact    Gross Motor Skills-Upper: Left Intact; Right Intact    Tone & Sensation:    Tone: Normal  Sensation: Intact                      Balance:  Sitting: Intact  Standing: Impaired  Standing - Static: Good  Standing - Dynamic : Fair    Functional Mobility and Transfers for ADLs:  Bed Mobility:  Rolling: Supervision  Supine to Sit: Supervision  Scooting: Supervision    Transfers:  Sit to Stand: Contact guard assistance; Additional time  Stand to Sit: Contact guard assistance; Additional time  Bed to Chair: Contact guard assistance; Additional time(with RW)  Bathroom Mobility: Contact guard assistance  Toilet Transfer : Contact guard assistance    ADL Assessment:  Feeding: Independent    Oral Facial Hygiene/Grooming: Setup(seated)    Bathing: Minimum assistance    Upper Body Dressing: Setup    Lower Body Dressing: Minimum assistance    Toileting: Minimum assistance                ADL Intervention and task modifications:  Educated pt on walker safety, pacing and need for shower chair  with back at home for energy conservation. Cognitive Retraining  Safety/Judgement: Awareness of environment; Fall prevention    Therapeutic Exercise:  Standing hands off of walker, reaching over head with both hands, touching buttocks in standing with both hands and touching knees and coming back to standing upright all performed with CGA.      Functional Measure:  Barthel Index:    Bathin  Bladder: 10  Bowels: 10  Grooming: 5  Dressin  Feeding: 10  Mobility: 5  Stairs: 0  Toilet Use: 5  Transfer (Bed to Chair and Back): 10  Total: 60/100        The Barthel ADL Index: Guidelines  1. The index should be used as a record of what a patient does, not as a record of what a patient could do. 2. The main aim is to establish degree of independence from any help, physical or verbal, however minor and for whatever reason. 3. The need for supervision renders the patient not independent. 4. A patient's performance should be established using the best available evidence. Asking the patient, friends/relatives and nurses are the usual sources, but direct observation and common sense are also important. However direct testing is not needed. 5. Usually the patient's performance over the preceding 24-48 hours is important, but occasionally longer periods will be relevant. 6. Middle categories imply that the patient supplies over 50 per cent of the effort. 7. Use of aids to be independent is allowed. Rafael Schuler., Barthel, D.W. (6151). Functional evaluation: the Barthel Index. [facility] (14)2. [place] Armando long JAMIE Hendrickson, Rosa Maria Rivera., Juan Alberto Chou., [place], [facility] (). Measuring the change indisability after inpatient rehabilitation; comparison of the responsiveness of the Barthel Index and Functional [place] Measure. Journal of Neurology, Neurosurgery, and Psychiatry, 66(4), 909-854. Nini Hanley, N.J.A, KEATON Ng, & Emili Munoz M.A. (2004.) Assessment of post-stroke quality of life in cost-effectiveness studies: The usefulness of the Barthel Index and the EuroQoL-5D.  Quality of Life Research, 15, 864-44         Occupational Therapy Evaluation Charge Determination   History Examination Decision-Making   LOW Complexity : Brief history review  LOW Complexity : 1-3 performance deficits relating to physical, cognitive , or psychosocial skils that result in activity limitations and / or participation restrictions  LOW Complexity : No comorbidities that affect functional and no verbal or physical assistance needed to complete eval tasks       Based on the above components, the patient evaluation is determined to be of the following complexity level: LOW   Pain Ratin/10    Activity Tolerance:   Fair, requires rest breaks, and observed SOB with activity  Please refer to the flowsheet for vital signs taken during this treatment. After treatment patient left in no apparent distress:    Sitting in chair and Call bell within reach    COMMUNICATION/EDUCATION:   The patients plan of care was discussed with: Physical therapist, Registered nurse, and patient. Home safety education was provided and the patient/caregiver indicated understanding., Patient/family have participated as able in goal setting and plan of care. , and Patient/family agree to work toward stated goals and plan of care. This patients plan of care is appropriate for delegation to [facility].     Thank you for this referral.  Ed Flanagan OTR/L  Time Calculation: 20 mins

## 2020-07-06 NOTE — PROGRESS NOTES
6226: Message sent to MD about critical lactic of 4.2    0700: CPC END OF SHIFT REPORT      Bedside shift change report GIVEN TO Venancio Gallagher RN. Report included the following information SBAR, Kardex, Procedure Summary, Intake/Output, MAR, Recent Results and Cardiac Rhythm V-paced. SIGNIFICANT CHANGES DURING SHIFT:        CONCERNS TO ADDRESS WITH MD:          [facility] NURSING NOTE   Admission Date 7/4/2020   Admission Diagnosis Pneumonia [J18.9]  Lung mass [R91.8]   Consults IP CONSULT TO INTERVENTIONAL RADIOLOGY      Cardiac Monitoring [x] Yes [] No      Purposeful Hourly Rounding [x] Yes    Kj Score Total Score: 3   Kj score 3 or > [x] Bed Alarm [] Avasys [] 1:1 sitter [] Patient refused (Signed refusal form in chart)   Mack Score Mack Score: 19   Mack score 14 or < [] PMT consult [] Wound Care consult    []  Specialty bed  [] Nutrition consult      Influenza Vaccine Received Flu Vaccine for Current Season (usually Sept-March): Not Flu Season           Oxygen needs? [x] Room air Oxygen @  []1L    []2L    []3L   []4L    []5L   []6L via  NC   Chronic home O2 use? [] Yes [x] No  Perform O2 challenge test and document in progress note using smartphrase (.Homeoxygen)      Last bowel movement Last Bowel Movement Date: 07/05/20      Urinary Catheter             LDAs               Peripheral IV 07/04/20 Right Antecubital (Active)   Site Assessment Clean, dry, & intact 7/6/2020  2:46 AM   Phlebitis Assessment 0 7/6/2020  2:46 AM   Infiltration Assessment 0 7/6/2020  2:46 AM   Dressing Status Clean, dry, & intact 7/6/2020  2:46 AM   Dressing Type Transparent;Tape 7/6/2020  2:46 AM   Hub Color/Line Status Pink; Infusing;Flushed 7/6/2020  2:46 AM                         Readmission Risk Assessment Tool Score Low Risk            9       Total Score        9 Pt. Coverage (Medicare=5 , Medicaid, or Self-Pay=4)        Criteria that do not apply:    Has Seen PCP in Last 6 Months (Yes=3, No=0)    .  Living with Significant Other. Assisted Living. LTAC. SNF.  or   Rehab    Patient Length of Stay (>5 days = 3)    IP Visits Last 12 Months (1-3=4, 4=9, >4=11)    Charlson Comorbidity Score (Age + Comorbid Conditions)       Expected Length of Stay - - -   Actual Length of Stay 2

## 2020-07-06 NOTE — ROUTINE PROCESS
Procedure reviewed with patient by Dr. Nic Martinez. Opportunity to verbalize questions and concerns. Consent obtained.

## 2020-07-06 NOTE — PROGRESS NOTES
Physical Therapy    Chart reviewed and attempted to see patient, but he is currently off the floor. Will defer and continue to follow.     Phoebe Leyden

## 2020-07-06 NOTE — PROGRESS NOTES
Hospitalist Progress Note    NAME: Lyn Dai   :  1940   MRN:  [id number]       Assessment / Plan:  Malignant left pleural effusion POA patient scheduled for thoracentesis symptomatic with shortness of breath   Left lung mass measuring 17 x 15 cm patient is status post biopsy we will follow-up this likely is malignancy. Concern for possible CAP patient with elevated lactic acid levels I am ordering procalcitonin levels will monitor the labs there was mild elevated white counts empirically treated with IV antibiotics no surgical candidate for resection. Has appointment to follow-up. Mild hypercalcem this could be malignancy related possible paraneoplastic syndrome. ia improving with fluids. Corrected calcium was 11.8. Chlorthalidone is held. Corrected calcium today is 11.6  Slight improvement with gentle hydration. History of coronary artery disease status post CABG    Hyperlipidemia we will continue Lipitor    Essential hypertension we will continue patient's antihypertensives. Coronary artery disease on aspirin. History of emphysema continue nebulizers as needed. Out of bed to chair follow-up with pathology.     / Body mass index is 25.67 kg/m². Code status: DNR  Prophylaxis: Lovenox  Recommended Disposition: Home. Subjective:     Chief Complaint / Reason for Physician Visit    Discussed with RN events overnight. Patient seen and examined at bedside comfortable no fever no chills no nausea no vomiting. Patient scheduled for thoracentesis. Review of Systems:  Symptom Y/N Comments  Symptom Y/N Comments   Fever/Chills n   Chest Pain n    Poor Appetite n   Edema     Cough n   Abdominal Pain     Sputum n   Joint Pain     SOB/HORN n   Pruritis/Rash     Nausea/vomit n   Tolerating PT/OT     Diarrhea n   Tolerating Diet     Constipation n   Other       Could NOT obtain due to:      Objective:     VITALS:   Last 24hrs VS reviewed since prior progress note.  Most recent are:  Patient Vitals for the past 24 hrs:   Temp Pulse Resp BP SpO2   07/06/20 0920  87 20 110/57 98 %   07/06/20 0900  86 20 104/61 97 %   07/06/20 0850  80 20 101/62 98 %   07/06/20 0748 97.7 °F (36.5 °C) 88 20 115/78 93 %   07/06/20 0730     98 %   07/06/20 0253    118/69    07/06/20 0246 (!) 96.4 °F (35.8 °C) 97 22 (!) 171/103 97 %   07/05/20 2332 97.7 °F (36.5 °C) 87 18 (!) 128/93 95 %   07/05/20 2008 97.6 °F (36.4 °C) 80 16 111/78 93 %   07/05/20 1913     93 %   07/05/20 1548 98 °F (36.7 °C) 82 20 111/81 90 %   07/05/20 1117 97.8 °F (36.6 °C) (!) 101 16 118/60 97 %       Intake/Output Summary (Last 24 hours) at 7/6/2020 1014  Last data filed at 7/6/2020 0246  Gross per 24 hour   Intake 641.67 ml   Output    Net 641.67 ml        PHYSICAL EXAM:  General: WD, WN. Alert, cooperative, no acute distress    EENT:  EOMI. Anicteric sclerae. MMM  Resp:  CTA bilaterally, no wheezing or rales. No accessory muscle use  CV:  Regular  rhythm,  No edema  GI:  Soft, Non distended, Non tender.  +Bowel sounds  Neurologic:  Alert and oriented X 3, normal speech,   Psych:   Good insight. Not anxious nor agitated  Skin:  No rashes. No jaundice    Reviewed most current lab test results and cultures  YES  Reviewed most current radiology test results   YES  Review and summation of old records today    NO  Reviewed patient's current orders and MAR    YES  PMH/SH reviewed - no change compared to H&P  ________________________________________________________________________  Care Plan discussed with:    Comments   Patient x    Family      RN x    Care Manager     Consultant                        Multidiciplinary team rounds were held today with , nursing, pharmacist and clinical coordinator. Patient's plan of care was discussed; medications were reviewed and discharge planning was addressed.      ________________________________________________________________________          Comments   >50% of visit spent in counseling and coordination of care     ________________________________________________________________________  Li Owusu MD     Procedures: see electronic medical records for all procedures/Xrays and details which were not copied into this note but were reviewed prior to creation of Plan. LABS:  I reviewed today's most current labs and imaging studies.   Pertinent labs include:  Recent Labs     07/06/20  0216 07/05/20  0140 07/04/20  1545   WBC 14.0* 12.1* 13.0*   HGB 12.0* 11.5* 11.6*   HCT 40.1 37.8 36.8    197 211     Recent Labs     07/06/20  0216 07/04/20  1545    142   K 5.1 5.1   * 106   CO2 23 26   * 114*   BUN 17 18   CREA 0.97 1.14   CA 10.0 10.3*   ALB 2.0* 2.1*   TBILI 0.7 0.4   ALT 16 19   INR  --  1.3*       Signed: Li Owusu MD

## 2020-07-06 NOTE — PROGRESS NOTES
Attempted to see pt for OT services. Pt is currently off the floor. Will defer, but continue to follow.

## 2020-07-06 NOTE — PROGRESS NOTES
Problem: Mobility Impaired (Adult and Pediatric)  Goal: *Acute Goals and Plan of Care (Insert Text)  Description: FUNCTIONAL STATUS PRIOR TO ADMISSION: Patient was ambulatory with cane for short household distances, reports he quickly becomes SOB. HOME SUPPORT PRIOR TO ADMISSION: Patient lives with wife, required some assistance with ADLs. Physical Therapy Goals  Initiated 7/6/2020  1. Patient will move from supine to sit and sit to supine  in bed with independence within 7 day(s). 2.  Patient will transfer from bed to chair and chair to bed with supervision/set-up using the least restrictive device within 7 day(s). 3.  Patient will perform sit to stand with supervision/set-up within 7 day(s). 4.  Patient will ambulate with minimal assistance/contact guard assist for 100 feet with the least restrictive device within 7 day(s). 5.  Patient will ascend/descend 7 stairs with  handrail(s) with supervision/set-up within 7 day(s). Outcome: Not Met   PHYSICAL THERAPY EVALUATION  Patient: Luz Marina Fountain (22 y.o. male)  Date: 7/6/2020  Primary Diagnosis: Pneumonia [J18.9]  Lung mass [R91.8]        Precautions:   DNR      ASSESSMENT  Based on the objective data described below, the patient presents with decreased tolerance of activity, general weakness, very [place], and impaired ability to transfer and ambulate. Patient received in bed and agreeable to participate, able to come to EOB with supervision and sitting balance is intact. Patient came to stand with CGA to RW and able to take a few slow steps to chair, reports SOB and fatigue. Patient able to rest in sitting position then stood to RW for marching, knee bends and UE mobility. Sats at 95% with activity on RA. Patient left up in chair with call bell in reach, would benefit from HHPT post discharge and RW for safety. Current Level of Function Impacting Discharge (mobility/balance): CGA for ambulation    Functional Outcome Measure:   The patient scored 60/100 on the Barthel outcome measure which is indicative of moderate functional impairment    Other factors to consider for discharge: falls risk, will likely need increased assistance from family     Patient will benefit from skilled therapy intervention to address the above noted impairments. PLAN :  Recommendations and Planned Interventions: bed mobility training, transfer training, gait training, therapeutic exercises, neuromuscular re-education, patient and family training/education, and therapeutic activities      Frequency/Duration: Patient will be followed by physical therapy:  4 times a week to address goals. Recommendation for discharge: (in order for the patient to meet his/her long term goals)  Physical therapy at least 2 days/week in the home AND ensure assist and/or supervision for safety with mobility     This discharge recommendation:  A follow-up discussion with the attending provider and/or case management is planned    IF patient discharges home will need the following DME: rolling walker         SUBJECTIVE:   Patient stated I would like to sit up.     OBJECTIVE DATA SUMMARY:   HISTORY:    No past medical history on file. No past surgical history on file. Personal factors and/or comorbidities impacting plan of care: likely malignant neoplasm    Home Situation  Home Environment: Private residence(double wide)  # Steps to Enter: 7  One/Two Story Residence: One story  Living Alone: No(son lives with him)  Support Systems: Child(jeramin)  Patient Expects to be Discharged to[de-identified] Private residence  Current DME Used/Available at Home: kelly Knutson(at home)    EXAMINATION/PRESENTATION/DECISION MAKING:   Critical Behavior:  Neurologic State: Alert  Orientation Level: Oriented X4  Cognition: Follows commands([place])  Safety/Judgement: Awareness of environment, Fall prevention  Hearing:   Auditory  Auditory Impairment: Hard of hearing, bilateral  Range Of Motion:  AROM: Generally decreased, functional           PROM: Within functional limits           Strength:    Strength: Generally decreased, functional                    Tone & Sensation:   Tone: Normal              Sensation: Intact               Coordination:  Coordination: Within functional limits  Vision:   Corrective Lenses: Reading glasses  Functional Mobility:  Bed Mobility:  Rolling: Supervision  Supine to Sit: Supervision     Scooting: Supervision  Transfers:  Sit to Stand: Contact guard assistance; Additional time  Stand to Sit: Contact guard assistance; Additional time        Bed to Chair: Contact guard assistance; Additional time(with RW)              Balance:   Sitting: Intact  Standing: Impaired  Standing - Static: Good  Standing - Dynamic : Fair  Ambulation/Gait Training:  Distance (ft): 4 Feet (ft)  Assistive Device: Gait belt;Walker, rolling  Ambulation - Level of Assistance: Contact guard assistance        Gait Abnormalities: Decreased step clearance        Base of Support: Widened     Speed/Mray: Pace decreased (<100 feet/min)  Step Length: Left shortened;Right shortened                     Stairs: Therapeutic Exercises: Ankle pumps, knee bends, marching    Functional Measure:  Barthel Index:    Bathin  Bladder: 10  Bowels: 10  Groomin  Dressin  Feeding: 10  Mobility: 5  Stairs: 0  Toilet Use: 5  Transfer (Bed to Chair and Back): 10  Total: 60/100       The Barthel ADL Index: Guidelines  1. The index should be used as a record of what a patient does, not as a record of what a patient could do. 2. The main aim is to establish degree of independence from any help, physical or verbal, however minor and for whatever reason. 3. The need for supervision renders the patient not independent. 4. A patient's performance should be established using the best available evidence. Asking the patient, friends/relatives and nurses are the usual sources, but direct observation and common sense are also important. However direct testing is not needed. 5. Usually the patient's performance over the preceding 24-48 hours is important, but occasionally longer periods will be relevant. 6. Middle categories imply that the patient supplies over 50 per cent of the effort. 7. Use of aids to be independent is allowed. Lissette Ordoñez., Barthel, D.W. (7471). Functional evaluation: the Barthel Index. [facility] (14)2. JAMIE Palacios, Lise Camacho., Familia Velazquez, [place], [address] (1999). Measuring the change indisability after inpatient rehabilitation; comparison of the responsiveness of the Barthel Index and Functional [place] Measure. Journal of Neurology, Neurosurgery, and Psychiatry, 66(4), 334-754. KEISHA Walls, KEATON Ng, & Jarrod Shukla M.A. (2004.) Assessment of post-stroke quality of life in cost-effectiveness studies: The usefulness of the Barthel Index and the EuroQoL-5D. Quality of Life Research, 15, 123-36           Physical Therapy Evaluation Charge Determination   History Examination Presentation Decision-Making   MEDIUM  Complexity : 1-2 comorbidities / personal factors will impact the outcome/ POC  MEDIUM Complexity : 3 Standardized tests and measures addressing body structure, function, activity limitation and / or participation in recreation  MEDIUM Complexity : Evolving with changing characteristics  MEDIUM Complexity : FOTO score of 26-74      Based on the above components, the patient evaluation is determined to be of the following complexity level: MEDIUM    Pain Rating:      Activity Tolerance:   Poor  Please refer to the flowsheet for vital signs taken during this treatment. After treatment patient left in no apparent distress:   Sitting in chair and Call bell within reach    COMMUNICATION/EDUCATION:   The patients plan of care was discussed with: Occupational therapist and Registered nurse.      Fall prevention education was provided and the patient/caregiver indicated understanding. and Patient/family agree to work toward stated goals and plan of care.     Thank you for this referral.  Luz Sawyer, PT   Time Calculation: 19 mins

## 2020-07-07 ENCOUNTER — APPOINTMENT (OUTPATIENT)
Dept: GENERAL RADIOLOGY | Age: 80
DRG: 180 | End: 2020-07-07
Attending: INTERNAL MEDICINE
Payer: MEDICARE

## 2020-07-07 ENCOUNTER — APPOINTMENT (OUTPATIENT)
Dept: ULTRASOUND IMAGING | Age: 80
DRG: 180 | End: 2020-07-07
Attending: INTERNAL MEDICINE
Payer: MEDICARE

## 2020-07-07 LAB
ANION GAP SERPL CALC-SCNC: 9 MMOL/L (ref 5–15)
ATRIAL RATE: 75 BPM
ATRIAL RATE: 81 BPM
BUN SERPL-MCNC: 22 MG/DL (ref 6–20)
BUN/CREAT SERPL: 23 (ref 12–20)
CALCIUM SERPL-MCNC: 9.7 MG/DL (ref 8.5–10.1)
CALCULATED P AXIS, ECG09: 42 DEGREES
CALCULATED P AXIS, ECG09: 51 DEGREES
CALCULATED R AXIS, ECG10: -101 DEGREES
CALCULATED R AXIS, ECG10: -102 DEGREES
CALCULATED T AXIS, ECG11: 78 DEGREES
CALCULATED T AXIS, ECG11: 81 DEGREES
CHLORIDE SERPL-SCNC: 110 MMOL/L (ref 97–108)
CO2 SERPL-SCNC: 20 MMOL/L (ref 21–32)
CREAT SERPL-MCNC: 0.94 MG/DL (ref 0.7–1.3)
DIAGNOSIS, 93000: NORMAL
DIAGNOSIS, 93000: NORMAL
ERYTHROCYTE [DISTWIDTH] IN BLOOD BY AUTOMATED COUNT: 16.6 % (ref 11.5–14.5)
GLUCOSE BLD STRIP.AUTO-MCNC: 148 MG/DL (ref 65–100)
GLUCOSE SERPL-MCNC: 151 MG/DL (ref 65–100)
HCT VFR BLD AUTO: 40 % (ref 36.6–50.3)
HGB BLD-MCNC: 12.3 G/DL (ref 12.1–17)
LACTATE SERPL-SCNC: 3.9 MMOL/L (ref 0.4–2)
MCH RBC QN AUTO: 27 PG (ref 26–34)
MCHC RBC AUTO-ENTMCNC: 30.8 G/DL (ref 30–36.5)
MCV RBC AUTO: 87.9 FL (ref 80–99)
NRBC # BLD: 0 K/UL (ref 0–0.01)
NRBC BLD-RTO: 0 PER 100 WBC
P-R INTERVAL, ECG05: 160 MS
P-R INTERVAL, ECG05: 168 MS
PLATELET # BLD AUTO: 241 K/UL (ref 150–400)
PMV BLD AUTO: 11.5 FL (ref 8.9–12.9)
POTASSIUM SERPL-SCNC: 4.7 MMOL/L (ref 3.5–5.1)
Q-T INTERVAL, ECG07: 476 MS
Q-T INTERVAL, ECG07: 478 MS
QRS DURATION, ECG06: 194 MS
QRS DURATION, ECG06: 198 MS
QTC CALCULATION (BEZET), ECG08: 533 MS
QTC CALCULATION (BEZET), ECG08: 552 MS
RBC # BLD AUTO: 4.55 M/UL (ref 4.1–5.7)
SERVICE CMNT-IMP: ABNORMAL
SODIUM SERPL-SCNC: 139 MMOL/L (ref 136–145)
TROPONIN I SERPL-MCNC: <0.05 NG/ML
VENTRICULAR RATE, ECG03: 75 BPM
VENTRICULAR RATE, ECG03: 81 BPM
WBC # BLD AUTO: 19.1 K/UL (ref 4.1–11.1)

## 2020-07-07 PROCEDURE — 65270000029 HC RM PRIVATE

## 2020-07-07 PROCEDURE — 93005 ELECTROCARDIOGRAM TRACING: CPT

## 2020-07-07 PROCEDURE — 85027 COMPLETE CBC AUTOMATED: CPT

## 2020-07-07 PROCEDURE — 83605 ASSAY OF LACTIC ACID: CPT

## 2020-07-07 PROCEDURE — 74011250636 HC RX REV CODE- 250/636: Performed by: INTERNAL MEDICINE

## 2020-07-07 PROCEDURE — 74011000258 HC RX REV CODE- 258: Performed by: INTERNAL MEDICINE

## 2020-07-07 PROCEDURE — 74011250637 HC RX REV CODE- 250/637: Performed by: INTERNAL MEDICINE

## 2020-07-07 PROCEDURE — 84484 ASSAY OF TROPONIN QUANT: CPT

## 2020-07-07 PROCEDURE — 74011000250 HC RX REV CODE- 250: Performed by: INTERNAL MEDICINE

## 2020-07-07 PROCEDURE — 94640 AIRWAY INHALATION TREATMENT: CPT

## 2020-07-07 PROCEDURE — 82962 GLUCOSE BLOOD TEST: CPT

## 2020-07-07 PROCEDURE — 94760 N-INVAS EAR/PLS OXIMETRY 1: CPT

## 2020-07-07 PROCEDURE — 80048 BASIC METABOLIC PNL TOTAL CA: CPT

## 2020-07-07 PROCEDURE — 36415 COLL VENOUS BLD VENIPUNCTURE: CPT

## 2020-07-07 PROCEDURE — 74011250637 HC RX REV CODE- 250/637

## 2020-07-07 PROCEDURE — 71045 X-RAY EXAM CHEST 1 VIEW: CPT

## 2020-07-07 RX ORDER — OXYCODONE HYDROCHLORIDE 5 MG/1
5 TABLET ORAL
Status: DISCONTINUED | OUTPATIENT
Start: 2020-07-07 | End: 2020-07-13 | Stop reason: HOSPADM

## 2020-07-07 RX ORDER — NITROGLYCERIN 0.4 MG/1
0.4 TABLET SUBLINGUAL AS NEEDED
Status: DISCONTINUED | OUTPATIENT
Start: 2020-07-07 | End: 2020-07-08

## 2020-07-07 RX ORDER — LORAZEPAM 0.5 MG/1
0.5 TABLET ORAL
Status: DISCONTINUED | OUTPATIENT
Start: 2020-07-07 | End: 2020-07-13 | Stop reason: HOSPADM

## 2020-07-07 RX ORDER — NITROGLYCERIN 0.4 MG/1
TABLET SUBLINGUAL
Status: COMPLETED
Start: 2020-07-07 | End: 2020-07-07

## 2020-07-07 RX ADMIN — Medication 5 ML: at 21:22

## 2020-07-07 RX ADMIN — NITROGLYCERIN 0.4 MG: 0.4 TABLET, ORALLY DISINTEGRATING SUBLINGUAL at 06:10

## 2020-07-07 RX ADMIN — ARFORMOTEROL TARTRATE: 15 SOLUTION RESPIRATORY (INHALATION) at 08:00

## 2020-07-07 RX ADMIN — PIPERACILLIN AND TAZOBACTAM 3.38 G: 3; .375 INJECTION, POWDER, LYOPHILIZED, FOR SOLUTION INTRAVENOUS at 03:53

## 2020-07-07 RX ADMIN — FAMOTIDINE 20 MG: 10 INJECTION INTRAVENOUS at 21:22

## 2020-07-07 RX ADMIN — FAMOTIDINE 20 MG: 10 INJECTION INTRAVENOUS at 11:10

## 2020-07-07 RX ADMIN — ALLOPURINOL 100 MG: 100 TABLET ORAL at 11:13

## 2020-07-07 RX ADMIN — COLCHICINE 0.6 MG: 0.6 TABLET ORAL at 11:13

## 2020-07-07 RX ADMIN — ARFORMOTEROL TARTRATE: 15 SOLUTION RESPIRATORY (INHALATION) at 19:59

## 2020-07-07 RX ADMIN — PIPERACILLIN AND TAZOBACTAM 3.38 G: 3; .375 INJECTION, POWDER, LYOPHILIZED, FOR SOLUTION INTRAVENOUS at 11:18

## 2020-07-07 RX ADMIN — NITROGLYCERIN 0.4 MG: 0.4 TABLET, ORALLY DISINTEGRATING SUBLINGUAL at 06:20

## 2020-07-07 RX ADMIN — ACETAMINOPHEN 650 MG: 325 TABLET ORAL at 17:27

## 2020-07-07 RX ADMIN — ALLOPURINOL 100 MG: 100 TABLET ORAL at 17:27

## 2020-07-07 RX ADMIN — METHYLPREDNISOLONE SODIUM SUCCINATE 40 MG: 40 INJECTION, POWDER, FOR SOLUTION INTRAMUSCULAR; INTRAVENOUS at 11:10

## 2020-07-07 RX ADMIN — AMLODIPINE BESYLATE 10 MG: 5 TABLET ORAL at 11:13

## 2020-07-07 RX ADMIN — ATENOLOL 25 MG: 25 TABLET ORAL at 11:13

## 2020-07-07 RX ADMIN — ATORVASTATIN CALCIUM 10 MG: 10 TABLET, FILM COATED ORAL at 11:13

## 2020-07-07 RX ADMIN — MORPHINE SULFATE 1 MG: 2 INJECTION, SOLUTION INTRAMUSCULAR; INTRAVENOUS at 06:00

## 2020-07-07 RX ADMIN — LISINOPRIL 20 MG: 20 TABLET ORAL at 11:13

## 2020-07-07 RX ADMIN — Medication 10 ML: at 05:33

## 2020-07-07 RX ADMIN — Medication 10 ML: at 15:13

## 2020-07-07 RX ADMIN — NITROGLYCERIN 0.4 MG: 0.4 TABLET, ORALLY DISINTEGRATING SUBLINGUAL at 09:12

## 2020-07-07 NOTE — PROGRESS NOTES
Hospitalist Progress Note    NAME: Saul Loredo   :  1940   MRN:  [id number]       Assessment / Plan:  Malignant pleural effusion POA status post thoracentesis fluid seem to be reaccumulating consistent with malignant effusion. We will continue monitoring. Shortness of breath secondary to effusion improved with thoracentesis. Lung mass status post biopsy full pathology report pending this may be outpatient. They had planned family meeting as outpatient we will consult palliative care goals of care discussion. Left-sided chest pain secondary to malignancy effusion musculoskeletal we will continue monitoring pain control. Tropes have been sent which was negative. Mild hypercalcemia likely second to malignancy resolved today calcium is 9.7. Left upper extremity swelling scheduled ultrasound to rule out clots especially in the malignant patient. Lactic acidosis improving this is unlikely infection procalcitonin levels sent was 0.29. Lactic acid improved 3.9  Would not check again. I have met with the patient's POA discussed at length about plan of care. She indicated that they had biopsy and there was also meet at the cancer center with the oncologist on . We talked about the fluid which appeared to malignant effusion with recurrent  and the pain. We discussed about options including hospice palliative care pain control. We agreed that patient could be discharged tomorrow and follow-up at the [address] [place] [address] since they have all his reports and they already have plan. She appears to be leaning more towards hospice as per our conversation. Discharge planning discussed with case management. Palliative care patient is a DO NOT RESUSCITATE. Body mass index is 25.67 kg/m². Code status: DNR  Prophylaxis: Lovenox  Recommended Disposition: Home     Subjective:     Chief Complaint / Reason for Physician Visit    Discussed with RN events overnight.   Patient seen and examined at bedside comfortable patient has been complaining of left-sided pain in the whole of the left side which is chronic appear to be consistent with patient's lung mass/lung cancer patient had biopsy full pathology report pending. Patient also had malignant effusion had thoracentesis significant amount of fluid was taken. Review of Systems:  Symptom Y/N Comments  Symptom Y/N Comments   Fever/Chills n   Chest Pain y    Poor Appetite n   Edema n    Cough n   Abdominal Pain n    Sputum n   Joint Pain n    SOB/HORN n   Pruritis/Rash n    Nausea/vomit n   Tolerating PT/OT     Diarrhea n   Tolerating Diet     Constipation    Other       Could NOT obtain due to:      Objective:     VITALS:   Last 24hrs VS reviewed since prior progress note. Most recent are:  Patient Vitals for the past 24 hrs:   Temp Pulse Resp BP SpO2   07/07/20 1113  81  106/63    07/07/20 1038  79 20 116/80 97 %   07/07/20 0925  76 20 91/68 99 %   07/07/20 0920  78 20 93/68 98 %   07/07/20 0912  80  108/68    07/07/20 0909  82 20 110/70 94 %   07/07/20 0800     94 %   07/07/20 0710 97.4 °F (36.3 °C) 76 18 97/77 100 %   07/07/20 0651    99/67    07/07/20 0645    99/71    07/07/20 0642  77  93/53    07/07/20 0634    (!) 85/62    07/07/20 0633    (!) 81/61    07/07/20 0620  83  108/75    07/07/20 0613     97 %   07/07/20 0605 97.6 °F (36.4 °C) 82 16 116/80 94 %   07/07/20 0300 97.6 °F (36.4 °C) 83 20 139/83 97 %   07/06/20 2303 97.5 °F (36.4 °C) 75 20 103/66 95 %   07/06/20 2000     95 %   07/06/20 1914    109/72    07/06/20 1831 97.3 °F (36.3 °C) 78 20 96/63 93 %   07/06/20 1430 97.8 °F (36.6 °C) 81 20 116/82 95 %       Intake/Output Summary (Last 24 hours) at 7/7/2020 1133  Last data filed at 7/7/2020 0300  Gross per 24 hour   Intake 630 ml   Output    Net 630 ml        PHYSICAL EXAM:  General: WD, WN. Alert, cooperative, no acute distress    EENT:  EOMI. Anicteric sclerae.  MMM  Resp:  CTA bilaterally, no wheezing or rales. No accessory muscle use  CV:  Regular  rhythm,  No edema  GI:  Soft, Non distended, Non tender.  +Bowel sounds  Neurologic:  Alert and oriented X 3, normal speech,   Psych:   Good insight. Not anxious nor agitated      Reviewed most current lab test results and cultures  YES  Reviewed most current radiology test results   YES  Review and summation of old records today    NO  Reviewed patient's current orders and MAR    YES  PMH/SH reviewed - no change compared to H&P  ________________________________________________________________________  Care Plan discussed with:    Comments   Patient     Family      RN     Care Manager     Consultant                        Multidiciplinary team rounds were held today with , nursing, pharmacist and clinical coordinator. Patient's plan of care was discussed; medications were reviewed and discharge planning was addressed. ________________________________________________________________________            Comments   >50% of visit spent in counseling and coordination of care     ________________________________________________________________________  Tangela Mccoy MD     Procedures: see electronic medical records for all procedures/Xrays and details which were not copied into this note but were reviewed prior to creation of Plan. LABS:  I reviewed today's most current labs and imaging studies.   Pertinent labs include:  Recent Labs     07/07/20  0301 07/06/20  0216 07/05/20  0140   WBC 19.1* 14.0* 12.1*   HGB 12.3 12.0* 11.5*   HCT 40.0 40.1 37.8    208 197     Recent Labs     07/07/20  0301 07/06/20  0216 07/04/20  1545    139 142   K 4.7 5.1 5.1   * 109* 106   CO2 20* 23 26   * 116* 114*   BUN 22* 17 18   CREA 0.94 0.97 1.14   CA 9.7 10.0 10.3*   ALB  --  2.0* 2.1*   TBILI  --  0.7 0.4   ALT  --  16 19   INR  --   --  1.3*       Signed: Tangela Mccoy MD

## 2020-07-07 NOTE — PROGRESS NOTES
7783: Assisted patient back in the bed. Patient complains of left sided chest pain rated as 7/10, described as stabbing pain. Denies any radiation to arm, back and neck. Placed order for EKG per CP protocol. 0912: Given Nitro SL for CP, EKG done  0924: CP rated as 6/10, Paged Dr. Marcos Palm.  BP: 91/68

## 2020-07-07 NOTE — PROGRESS NOTES
RAPID RESPONSE TEAM    0605  Responded to overhead rapid response for chest pain in room 2203. Per primary RN, Ginette Abel, patient has a newly diagnosed lung mass and underwent a thoracentesis on 7/6/20. This morning, the patient complains of chest pain. On assessment, patient is alert and awake. He demonstrates his pain to be in the left chest and non-radiating. Patient states the pain is 8/10 and feels like stabbing. Denies shortness of breath, nausea, palpitations, and diaphoresis. Patient states a similar sensation brought him to the hospital.  EKG in progress. Morphine already on the [facility] and being administered now. Visit Vitals  /80   Pulse 82   Temp 97.6 °F (36.4 °C)   Resp 16   Ht 6' 2\" (1.88 m)   Wt 90.7 kg (199 lb 15.3 oz)   SpO2 97%   BMI 25.67 kg/m²     0610  Nitro #1 administered. VSS. Pain 7/10.    6410   at bedside. MD reviewed EKG and deemed it non-ischemic. Atrial-sensed, ventricular-paced rhythm. Orders received for serial troponin x3 and CXR.    2928  Nitro #2 administered. VSS. Pain 7/10. Discussed with primary RN, Jewels Jacobs, and updated on plan of care. Recommendation made to administer third nitro if patient's BP is stable. If pain is still not relieved, contact  for further plan of care. RN encouraged to call with any questions or concerns.     Sylvie Cochran RN, BSN, CCRN  Rapid Response   Ext. [phone]

## 2020-07-07 NOTE — PROGRESS NOTES
Advance Care Planning Note      NAME: Lyn Dai   :  1940   MRN:  [id number]     Date/Time:  2020 12:48 PM    Active Diagnoses:  Hospital Problems  Never Reviewed          Codes Class Noted POA    Lung mass ICD-10-CM: R91.8  ICD-9-CM: 786.6  2020 Unknown        Pneumonia ICD-10-CM: J18.9  ICD-9-CM: 833  2020 Unknown              These active diagnoses are of sufficient risk that focused discussion on advance care planning is indicated in order to allow the patient to thoughtfully consider personal goals of care, and if situations arise that prevent the ability to personally give input, to ensure appropriate representation of their personal desires for different levels and aggressiveness of care. Discussion:   Code status addressed and wants to be a DNR. Had an extensive discussion about goals of care we talked about hospice we talked about trajectory of the disease and the effect of chemotherapy. And I told patient already that with fluid involvement pleural fluid and pleural effusion we are talking of already stage IV right the. She did indicate that is scheduled follow-up with oncology at the [address] [place] [address] I also recommend palliative care evaluation pain control. Patient healthcare proxy Ethan Fears who was present in the conversation does not want any aggressive intervention. Patient himself is very hard of hearing he was present in the conversation. Persons present and participating in discussion:Ainsley Rai MD,      Time Spent:   Total time spent face-to-face in education and discussion:   16 minutes.          Ainsley Madison MD   Hospitalist

## 2020-07-07 NOTE — PROGRESS NOTES
ADULT PROTOCOL: JET AEROSOL ASSESSMENT    Patient  Deleta Promise     79 y.o.   male     7/7/2020  5:54 AM    Breath Sounds Pre Procedure: Right Breath Sounds: Diminished                               Left Breath Sounds: Diminished    Breath Sounds Post Procedure: Right Breath Sounds: Diminished                                 Left Breath Sounds: Diminished    Breathing pattern: Pre procedure Breathing Pattern: Regular          Post procedure Breathing Pattern: Regular    Heart Rate: Pre procedure Pulse: 71           Post procedure Pulse: 73    Resp Rate: Pre procedure Respirations: 17           Post procedure Respirations: 18    Peak Flow: Pre bronchodilator             Post bronchodilator       FVC/FEV1:      Incentive Spirometry:             Cough: Pre procedure Cough: Non-productive               Post procedure Cough: Non-productive    Suctioned: NO    Sputum: Pre procedure                   Post procedure      Oxygen: O2 Device: Room air        Changed: NO    SpO2: Pre procedure SpO2: 95 %   without oxygen              Post procedure SpO2: 95 %  without oxygen    Nebulizer Therapy: Current medications Aerosolized Medications: Brovana Pulmicort      Changed: NO    Smoking History: former smoker    Problem List:   Patient Active Problem List   Diagnosis Code    Lung mass R91.8    Pneumonia J18.9       Respiratory Therapist: RT Aureliano

## 2020-07-07 NOTE — PROGRESS NOTES
Physical Therapy    Chart reviewed, patient complaining of increased chest pain and is pending EKG, also had RR earlier this morning. Plan to defer PT and continue to follow.     Clark Coats

## 2020-07-07 NOTE — PROGRESS NOTES
0700: Bedside shift change report given to RICKY Chavez (oncoming nurse) by Gladis Garg RN (offgoing nurse). Report included the following information SBAR, Intake/Output, MAR, Recent Results and Cardiac Rhythm V-paced. 4906: Lab called regarding Lactic Acid, Dr. Bobby Mckeon aware. No repeat lactic acids necessary, per Dr. Bobby Mckeon.     1000: Dr. Bobby Mckeon aware of CP, at bedside. Will wait for palliative team to see pt before adding scheduled pain medications. Dr. Bobby Mckeon believes more so pain from the lung/rib cage rather than CP. Dr. Bobby Mckeon does not want to bring on cardiology at this time. 1145: Family at bedside wanting an update from Dr. Bobby Mckeon. PerfectServe to Dr. Rosales Began. 1205: Dr. Rosales Began at bedside discussing plan of care.

## 2020-07-07 NOTE — PROGRESS NOTES
0600: Pt. Complaining of 8/10 chest pain, and \"feels like someone is stabbing me in my chest. I've never felt this before. \" PRN morphine given, EKG started and RRT called. Lissette. RRT nurse at bedside. 0700: Bedside shift change report given to RICKY Chavez (oncoming nurse) by Sakina Garvin (offgoing nurse). Report included the following information SBAR, Kardex, Procedure Summary, Intake/Output, MAR, Recent Results and Cardiac Rhythm V paced.

## 2020-07-07 NOTE — PROGRESS NOTES
Reason for Admission:   Pneumonia, lung mass, pleural effusion                  RUR Score:   11 Low risk for readmission               PCP: First and Last name:  Dr. Homero Guzman   Name of Practice:    Are you a current patient: Yes/No: Yes   Approximate date of last visit: 2  Weeks ago   Can you participate in a virtual visit if needed: No    Do you (patient/family) have any concerns for transition/discharge? None              Plan for utilizing home health:   Pt has not had home health in the past. Pt will be utilizing home health at d/c. Current Advanced Directive/Advance Care Plan:  Pt does not have an ACP on file. AMD will not be able to be addressed due to patients hearing            Transition of Care Plan:        Home with Home Health  2nd IM Letter  Follow-up Appointments    CM met with pt and pt's life partner Katy Mckeon at bedside to discuss d/c plan. CM verified pt's demographics, insurance and PCP. Pt is a 80 y/o [facility] male admitted [facility] on 7/4/2020 pneumonia, lung mass and pleural effusion. Pt's PCP is Dr. Homero Guzman. Pt sees PCP once a month. Pt resides with his life partner in an one level home with 6 BRAIN. Pt needs assistance with her ADL's and IADL's. Pt does not drive. Pt's life partner transports when necessary. No DME's. No HH, SNF or acute IPR. Pt is FULL code status. Pt does not have an ACP on file. Pt's life partner stated that she needs medical transport to get pt home at discharge. CM discussed with pt's life partner about home health. Pt's life partner stated that she was interested in home health for pt. CM provided pt's life partner with [facility]. Pt's life partner selected [facility]. [address] document signed by pt's life partner and placed in pt's bedside chart. Ref[facility] sent to [facility] via [employer]. Pt's life partner stated that she needed a bedside commode and rolling walker. CM informed pt's life partner that CM will send referral for DME.   CM sent DME referral to [facility] via [employer]. CM discuss with pt's life partner about pt's Medicare rights and their right to appeal the discharge. Pt's life partner understood pt's Medicare rights. Pt unable to sign. Pt's life partner signed 2nd IM letter and copy of 2nd IM letter was placed in pt bedside chart. Care Management Interventions  PCP Verified by CM: Yes  Palliative Care Criteria Met (RRAT>21 & CHF Dx)?: No  Mode of Transport at Discharge: BLS(Pt will need medical transport at d/c. )  Transition of Care Consult (CM Consult): [address]: No  Reason Outside [place]: Out of service area  Discharge Durable Medical Equipment: No(No DME's)  Physical Therapy Consult: No  Occupational Therapy Consult: No  Speech Therapy Consult: No  Current Support Network: Own Home, Other( Pt resides with his life partner in an one level home with 6 BRAIN.)  Confirm Follow Up Transport: Family(Pt does not drive. Pt's life partner transports when necessary. )  The Plan for Transition of Care is Related to the Following Treatment Goals : Home Health  The Patient and/or Patient Representative was Provided with a Choice of Provider and Agrees with the Discharge Plan?: Yes  Name of the Patient Representative Who was Provided with a Choice of Provider and Agrees with the Discharge Plan: Alfredo García  Freedom of Choice List was Provided with Basic Dialogue that Supports the Patient's Individualized Plan of Care/Goals, Treatment Preferences and Shares the Quality Data Associated with the Providers?: Yes  [place] Resource Information Provided?: No  Discharge Location  Discharge Placement: (Home with [address])    CM will continue to follow patient for discharge planning needs and arrange for services as deemed necessary.     Louisa Zhang [address]  [phone]

## 2020-07-07 NOTE — PROGRESS NOTES
11:15am- CM called Ida Pritchard via phone to complete initial assessment. No answer. Madison Avilez was full and no message could be left. 10:45am-CM met with pt at bedside to discuss d/c plan. CM introduced self and role. Pt had difficulty hearing CM to complete initial assessment. CM inquired with pt if he would like CM to call Ida Pritchard to complete assessment. Pt stated that he would like CM to call Ida Pritchard. CM will continue to follow patient for discharge planning needs and arrange for services as deemed necessary.     Louisa Velazco [address], [facility]  [phone]

## 2020-07-07 NOTE — PROGRESS NOTES
0700  Received bedside report from April Estrella, [address] [place] [address]. SBAR, Kardex, and MAR were discussed. Mayra 8 OF SHIFT REPORT      Bedside shift change report GIVEN TO April Estrella RN. Report included the following information SBAR, Kardex, ED Summary, Intake/Output, MAR, Recent Results and Cardiac Rhythm Vpaced. SIGNIFICANT CHANGES DURING SHIFT:  None      CONCERNS TO ADDRESS WITH MD:    Left Arm swelling          [facility] NURSING NOTE   Admission Date 7/4/2020   Admission Diagnosis Pneumonia [J18.9]  Lung mass [R91.8]   Consults IP CONSULT TO INTERVENTIONAL RADIOLOGY      Cardiac Monitoring [x] Yes [] No      Purposeful Hourly Rounding [x] Yes    Kj Score Total Score: 3   Kj score 3 or > [] Bed Alarm [] Avasys [] 1:1 sitter [] Patient refused (Signed refusal form in chart)   Mack Score Mack Score: 19   Mack score 14 or < [] PMT consult [] Wound Care consult    []  Specialty bed  [] Nutrition consult      Influenza Vaccine Received Flu Vaccine for Current Season (usually Sept-March): Not Flu Season           Oxygen needs? [x] Room air Oxygen @  []1L    []2L    []3L   []4L    []5L   []6L via  NC   Chronic home O2 use? [] Yes [x] No  Perform O2 challenge test and document in progress note using smartphCloudCrowde (.Homeoxygen)      Last bowel movement Last Bowel Movement Date: 07/06/20      Urinary Catheter             LDAs               Peripheral IV 07/04/20 Right Antecubital (Active)   Site Assessment Clean, dry, & intact 7/6/2020  8:00 PM   Phlebitis Assessment 0 7/6/2020  8:00 PM   Infiltration Assessment 0 7/6/2020  8:00 PM   Dressing Status Clean, dry, & intact 7/6/2020  5:17 PM   Dressing Type Transparent;Tape 7/6/2020  8:00 PM   Hub Color/Line Status Pink; Infusing 7/6/2020  8:00 PM                         Readmission Risk Assessment Tool Score Low Risk            9       Total Score        9 Pt.  Coverage (Medicare=5 , Medicaid, or Self-Pay=4)        Criteria that do not apply:    Has Seen PCP in Last 6 Months (Yes=3, No=0)    . Living with Significant Other. Assisted Living. LTAC. SNF.  or   Rehab    Patient Length of Stay (>5 days = 3)    IP Visits Last 12 Months (1-3=4, 4=9, >4=11)    Charlson Comorbidity Score (Age + Comorbid Conditions)       Expected Length of Stay 4d 21h   Actual Length of Stay 2

## 2020-07-07 NOTE — PROGRESS NOTES
Spiritual Care Assessment/Progress Note  [facility]      NAME: Rigoberto Dangelo      MRN: [id number]  AGE: 79 y.o. SEX: male  Worship Affiliation: No preference   Language: English     7/7/2020     Total Time (in minutes): 5     Spiritual Assessment begun in [facility]ARD[facility]MON[facility] C[facility] through conversation with:         [x]Patient        [] Family    [] Friend(s)        Reason for Consult: Palliative Care, Initial/Spiritual Assessment     Spiritual beliefs: (Please include comment if needed)     [] Identifies with a batsheva tradition:         [] Supported by a batsheva community:            [] Claims no spiritual orientation:           [] Seeking spiritual identity:                [] Adheres to an individual form of spirituality:           [x] Not able to assess:                           Identified resources for coping:      [] Prayer                               [] Music                  [] Guided Imagery     [x] Family/friends                 [] Pet visits     [] Devotional reading                         [] Unknown     [] Other:                                             Interventions offered during this visit: (See comments for more details)    Patient Interventions: Affirmation of emotions/emotional suffering           Plan of Care:     [] Support spiritual and/or cultural needs    [] Support AMD and/or advance care planning process      [] Support grieving process   [] Coordinate Rites and/or Rituals    [] Coordination with community clergy   [] No spiritual needs identified at this time   [] Detailed Plan of Care below (See Comments)  [] Make referral to Music Therapy  [] Make referral to Pet Therapy     [] Make referral to Addiction services  [] Make referral to [facility]  [] Make referral to Spiritual Care Partner  [] No future visits requested        [x] Follow up visits as needed     Visited pt for initial spiritual assessment.  Brief visit as pt is hard of hearing and he was having difficulty talking at the time of visit. Assured pt of ongoing  support as needed.   Chaplain Tyson MDiv, MS, [facility]  [phone]

## 2020-07-07 NOTE — PROGRESS NOTES
Problem: Falls - Risk of  Goal: *Absence of Falls  Description: Document Melani Howard Fall Risk and appropriate interventions in the flowsheet.   Outcome: Progressing Towards Goal  Note: Fall Risk Interventions:  Mobility Interventions: Bed/chair exit alarm, Patient to call before getting OOB, OT consult for ADLs, PT Consult for mobility concerns, PT Consult for assist device competence, Strengthening exercises (ROM-active/passive), Utilize walker, cane, or other assistive device         Medication Interventions: Bed/chair exit alarm, Teach patient to arise slowly, Patient to call before getting OOB    Elimination Interventions: Bed/chair exit alarm, Call light in reach, Patient to call for help with toileting needs, Toilet paper/wipes in reach, Toileting schedule/hourly rounds, Urinal in reach              Problem: Patient Education: Go to Patient Education Activity  Goal: Patient/Family Education  Outcome: Progressing Towards Goal     Problem: Patient Education: Go to Patient Education Activity  Goal: Patient/Family Education  Outcome: Progressing Towards Goal     Problem: Pneumonia: Day 3  Goal: Off Pathway (Use only if patient is Off Pathway)  Outcome: Progressing Towards Goal  Goal: Activity/Safety  Outcome: Progressing Towards Goal  Goal: Consults, if ordered  Outcome: Progressing Towards Goal  Goal: Diagnostic Test/Procedures  Outcome: Progressing Towards Goal  Goal: Nutrition/Diet  Outcome: Progressing Towards Goal  Goal: Discharge Planning  Outcome: Progressing Towards Goal  Goal: Medications  Outcome: Progressing Towards Goal  Goal: Respiratory  Outcome: Progressing Towards Goal  Goal: Treatments/Interventions/Procedures  Outcome: Progressing Towards Goal  Goal: Psychosocial  Outcome: Progressing Towards Goal  Goal: *Oxygen saturation within defined limits  Outcome: Progressing Towards Goal  Goal: *Hemodynamically stable  Outcome: Progressing Towards Goal  Goal: *Demonstrates progressive activity  Outcome: Progressing Towards Goal  Goal: *Tolerating diet  Outcome: Progressing Towards Goal  Goal: *Describes available resources and support systems  Outcome: Progressing Towards Goal  Goal: *Optimal pain control at patient's stated goal  Outcome: Progressing Towards Goal     Problem: Patient Education: Go to Patient Education Activity  Goal: Patient/Family Education  Outcome: Progressing Towards Goal     Problem: Patient Education: Go to Patient Education Activity  Goal: Patient/Family Education  Outcome: Progressing Towards Goal     Problem: Breathing Pattern - Ineffective  Goal: *Absence of hypoxia  Outcome: Progressing Towards Goal  Goal: *Use of effective breathing techniques  Outcome: Progressing Towards Goal  Goal: *PALLIATIVE CARE:  Alleviation of Dyspnea  Outcome: Progressing Towards Goal     Problem: Pressure Injury - Risk of  Goal: *Prevention of pressure injury  Description: Document Mack Scale and appropriate interventions in the flowsheet. Outcome: Progressing Towards Goal  Note: Pressure Injury Interventions:  Sensory Interventions: Assess changes in LOC, Keep linens dry and wrinkle-free, Maintain/enhance activity level, Minimize linen layers, Pressure redistribution bed/mattress (bed type), Sit a 90-degree angle/use footstool if needed, Turn and reposition approx. every two hours (pillows and wedges if needed), Use 30-degree side-lying position    Moisture Interventions: Absorbent underpads, Check for incontinence Q2 hours and as needed, Minimize layers, Offer toileting Q_hr    Activity Interventions: Increase time out of bed, Pressure redistribution bed/mattress(bed type), PT/OT evaluation    Mobility Interventions: HOB 30 degrees or less, Pressure redistribution bed/mattress (bed type), PT/OT evaluation, Turn and reposition approx.  every two hours(pillow and wedges)    Nutrition Interventions: Document food/fluid/supplement intake    Friction and Shear Interventions: Apply protective barrier, creams and emollients, HOB 30 degrees or less, Lift team/patient mobility team, Sit at 90-degree angle, Minimize layers                Problem: Patient Education: Go to Patient Education Activity  Goal: Patient/Family Education  Outcome: Progressing Towards Goal

## 2020-07-07 NOTE — PROGRESS NOTES
Pt has had increased chest pain, had RRT, EKG and now is having another repeat EKG. Will defer, but continue to follow.

## 2020-07-07 NOTE — PROGRESS NOTES
Problem: Falls - Risk of  Goal: *Absence of Falls  Description: Document Peg Fields Fall Risk and appropriate interventions in the flowsheet.   Outcome: Progressing Towards Goal  Note: Fall Risk Interventions:  Mobility Interventions: Communicate number of staff needed for ambulation/transfer, OT consult for ADLs, PT Consult for mobility concerns, PT Consult for assist device competence         Medication Interventions: Bed/chair exit alarm, Evaluate medications/consider consulting pharmacy, Patient to call before getting OOB    Elimination Interventions: Call light in reach

## 2020-07-08 ENCOUNTER — APPOINTMENT (OUTPATIENT)
Dept: VASCULAR SURGERY | Age: 80
DRG: 180 | End: 2020-07-08
Attending: INTERNAL MEDICINE
Payer: MEDICARE

## 2020-07-08 PROCEDURE — 74011250637 HC RX REV CODE- 250/637: Performed by: INTERNAL MEDICINE

## 2020-07-08 PROCEDURE — 74011000250 HC RX REV CODE- 250: Performed by: INTERNAL MEDICINE

## 2020-07-08 PROCEDURE — 65270000029 HC RM PRIVATE

## 2020-07-08 PROCEDURE — 74011250636 HC RX REV CODE- 250/636: Performed by: INTERNAL MEDICINE

## 2020-07-08 PROCEDURE — 87635 SARS-COV-2 COVID-19 AMP PRB: CPT

## 2020-07-08 PROCEDURE — 97530 THERAPEUTIC ACTIVITIES: CPT

## 2020-07-08 PROCEDURE — 97535 SELF CARE MNGMENT TRAINING: CPT

## 2020-07-08 PROCEDURE — 97116 GAIT TRAINING THERAPY: CPT

## 2020-07-08 PROCEDURE — 94640 AIRWAY INHALATION TREATMENT: CPT

## 2020-07-08 PROCEDURE — 93971 EXTREMITY STUDY: CPT

## 2020-07-08 RX ORDER — APIXABAN 5 MG (74)
KIT ORAL
Qty: 1 DOSE PACK | Refills: 1 | Status: SHIPPED | OUTPATIENT
Start: 2020-07-08

## 2020-07-08 RX ORDER — ENOXAPARIN SODIUM 100 MG/ML
1 INJECTION SUBCUTANEOUS EVERY 12 HOURS
Status: DISCONTINUED | OUTPATIENT
Start: 2020-07-08 | End: 2020-07-13 | Stop reason: HOSPADM

## 2020-07-08 RX ORDER — LEVOFLOXACIN 250 MG/1
500 TABLET ORAL DAILY
Qty: 10 TAB | Refills: 0 | Status: SHIPPED | OUTPATIENT
Start: 2020-07-08

## 2020-07-08 RX ORDER — OXYCODONE HYDROCHLORIDE 5 MG/1
5 TABLET ORAL
Qty: 30 TAB | Refills: 0 | Status: SHIPPED | OUTPATIENT
Start: 2020-07-08 | End: 2020-07-11

## 2020-07-08 RX ORDER — POLYETHYLENE GLYCOL 3350 17 G/17G
17 POWDER, FOR SOLUTION ORAL DAILY
Qty: 30 PACKET | Refills: 0 | Status: SHIPPED | OUTPATIENT
Start: 2020-07-08

## 2020-07-08 RX ADMIN — FAMOTIDINE 20 MG: 10 INJECTION INTRAVENOUS at 20:09

## 2020-07-08 RX ADMIN — AMLODIPINE BESYLATE 10 MG: 5 TABLET ORAL at 09:14

## 2020-07-08 RX ADMIN — ALLOPURINOL 100 MG: 100 TABLET ORAL at 17:25

## 2020-07-08 RX ADMIN — LISINOPRIL 20 MG: 20 TABLET ORAL at 09:14

## 2020-07-08 RX ADMIN — ATORVASTATIN CALCIUM 10 MG: 10 TABLET, FILM COATED ORAL at 09:14

## 2020-07-08 RX ADMIN — ACETAMINOPHEN 650 MG: 325 TABLET ORAL at 20:14

## 2020-07-08 RX ADMIN — ARFORMOTEROL TARTRATE: 15 SOLUTION RESPIRATORY (INHALATION) at 19:57

## 2020-07-08 RX ADMIN — ENOXAPARIN SODIUM 90 MG: 100 INJECTION SUBCUTANEOUS at 09:24

## 2020-07-08 RX ADMIN — ACETAMINOPHEN 650 MG: 325 TABLET ORAL at 10:03

## 2020-07-08 RX ADMIN — ALLOPURINOL 100 MG: 100 TABLET ORAL at 09:14

## 2020-07-08 RX ADMIN — Medication 10 ML: at 17:26

## 2020-07-08 RX ADMIN — ATENOLOL 25 MG: 25 TABLET ORAL at 09:14

## 2020-07-08 RX ADMIN — FAMOTIDINE 20 MG: 10 INJECTION INTRAVENOUS at 09:13

## 2020-07-08 RX ADMIN — COLCHICINE 0.6 MG: 0.6 TABLET ORAL at 12:51

## 2020-07-08 RX ADMIN — ENOXAPARIN SODIUM 90 MG: 100 INJECTION SUBCUTANEOUS at 20:09

## 2020-07-08 RX ADMIN — OXYCODONE 5 MG: 5 TABLET ORAL at 23:48

## 2020-07-08 RX ADMIN — Medication 5 ML: at 05:18

## 2020-07-08 RX ADMIN — Medication 5 ML: at 20:10

## 2020-07-08 NOTE — PROGRESS NOTES
Bedside and Verbal shift change report given to Laney Diaz (oncoming nurse) by Cassie Orantes RN (offgoing nurse). Report included the following information Kardex and MAR.

## 2020-07-08 NOTE — PALLIATIVE CARE
Chart reviewed, case discussed with Dr Daniela Prakash , noted his conversation regarding goals of care with patient yesterday 7/7/20, at this point goals are clear to follow up with oncology for new diagnosis of suspected lung cancer with pleural effusion , and currently there no other symptom management needs. Dr Kingsley Alejandro would cancel Palliative care consult. Thank you for including Palliative care in the care of this patient .     Juwan Lopez

## 2020-07-08 NOTE — PROGRESS NOTES
Hospitalist Progress Note    NAME: Kenneth Cowan   :  1940   MRN:  [id number]       Assessment / Plan:  Malignant pleural effusion POA status post thoracentesis doing better no shortness of breath patient likely to have reaccumulation of this malignant effusion. Symptomatic management    Left upper extremity extensive deep venous thrombosis start the patient on weight-based Lovenox will transition to [facility] for outpatient. Left Upper Venous     Acute appearing thrombus noted in the left subclavian, axillary and brachial vein(s). Superficial thrombophlebitis noted within the left basilic vein. Surveillance COVID since patient is going for long-term care have been instructed by case management to check COVID-19 for surveillance prior to going to facility. Mild hypercalcemia resolved likely second to malignancy. Lactic acidosis resolved. Left-sided chest pain secondary to deep venous thrombosis we will continue pain management. Anxiety we will continue Ativan.       / Body mass index is 24.61 kg/m². Code status: DO NOT RESUSCITATE. Prophylaxis: Patient is on weight-based Lovenox treatment dose. Recommended Disposition: Now plan is to go to long-term care or facility as wife or partner not able to care for patient. Subjective:     Chief Complaint / Reason for Physician Visit  \"  Discussed with RN events overnight. Patient seen and examined at bedside comfortable much more awake and alert respiratory rate help each patient is extremely hard at hearing had recommended to anything through the ears. Plan was to discharge patient today unfortunately the friend of the wife or the partner is not clear the relationship set she is not able to take care of him at home yesterday she was in agreement to taking the patient home. And also the ultrasound which was done on the left upper extremity came up with extensive DVT involving the subclavian.   And also involving multiple areas.  I received report from radiology. Review of Systems:  Symptom Y/N Comments  Symptom Y/N Comments   Fever/Chills n   Chest Pain n    Poor Appetite n   Edema n    Cough n   Abdominal Pain     Sputum n   Joint Pain     SOB/HORN n   Pruritis/Rash     Nausea/vomit n   Tolerating PT/OT     Diarrhea n   Tolerating Diet     Constipation n   Other       Could NOT obtain due to:      Objective:     VITALS:   Last 24hrs VS reviewed since prior progress note. Most recent are:  Patient Vitals for the past 24 hrs:   Temp Pulse Resp BP SpO2   07/08/20 0817 97.9 °F (36.6 °C) 86 16 (!) 124/95 96 %   07/07/20 2257 98.1 °F (36.7 °C) 72 15 126/70 97 %   07/07/20 2152 97.8 °F (36.6 °C) 74 16 132/74 98 %   07/07/20 2001     93 %   07/07/20 1952 97.6 °F (36.4 °C) 70 16 110/72 96 %   07/07/20 1448 96.9 °F (36.1 °C) 73 18 103/79 97 %       Intake/Output Summary (Last 24 hours) at 7/8/2020 1418  Last data filed at 7/8/2020 0701  Gross per 24 hour   Intake 250 ml   Output 650 ml   Net -400 ml        PHYSICAL EXAM:  General: WD, WN. Alert, cooperative, no acute distress    EENT:  EOMI. Anicteric sclerae. MMM  Resp:  CTA bilaterally, no wheezing or rales. No accessory muscle use  CV:  Regular  rhythm,  No edema  GI:  Soft, Non distended, Non tender.  +Bowel sounds  Neurologic:  Alert and oriented X 3, normal speech,   Psych:   Good insight. Not anxious nor agitated  Skin:  Left upper extremity extensive edema nontender also appear very dry.       Reviewed most current lab test results and cultures  YES  Reviewed most current radiology test results   YES  Review and summation of old records today    NO  Reviewed patient's current orders and MAR    YES  PMH/SH reviewed - no change compared to H&P  ________________________________________________________________________  Care Plan discussed with:    Comments   Patient     Family      RN     Care Manager     Consultant                        Multidiciplinary team rounds were held today with , nursing, pharmacist and clinical coordinator. Patient's plan of care was discussed; medications were reviewed and discharge planning was addressed. ________________________________________________________________________          Comments   >50% of visit spent in counseling and coordination of care     ________________________________________________________________________  Cheryl Rendon MD     Procedures: see electronic medical records for all procedures/Xrays and details which were not copied into this note but were reviewed prior to creation of Plan. LABS:  I reviewed today's most current labs and imaging studies.   Pertinent labs include:  Recent Labs     07/07/20 0301 07/06/20 0216   WBC 19.1* 14.0*   HGB 12.3 12.0*   HCT 40.0 40.1    208     Recent Labs     07/07/20 0301 07/06/20 0216    139   K 4.7 5.1   * 109*   CO2 20* 23   * 116*   BUN 22* 17   CREA 0.94 0.97   CA 9.7 10.0   ALB  --  2.0*   TBILI  --  0.7   ALT  --  16       Signed: Cheryl Rendon MD

## 2020-07-08 NOTE — PROGRESS NOTES
Problem: Falls - Risk of  Goal: *Absence of Falls  Description: Document Tavia Marques Fall Risk and appropriate interventions in the flowsheet.   Outcome: Progressing Towards Goal  Note: Fall Risk Interventions:  Mobility Interventions: Bed/chair exit alarm         Medication Interventions: Bed/chair exit alarm    Elimination Interventions: Call light in reach

## 2020-07-08 NOTE — PROGRESS NOTES
Problem: Falls - Risk of  Goal: *Absence of Falls  Description: Document Rojelio Hui Fall Risk and appropriate interventions in the flowsheet.   Outcome: Progressing Towards Goal  Note: Fall Risk Interventions:  Mobility Interventions: Bed/chair exit alarm, Patient to call before getting OOB         Medication Interventions: Bed/chair exit alarm    Elimination Interventions: Call light in reach, Bed/chair exit alarm

## 2020-07-08 NOTE — PROGRESS NOTES
RAPID RESPONSE TEAM- Follow Up     Rounded on patient due to transfer from CCU; admitted for SOB 2/2 malignant pleural effusion and lung mass. S/p thoracentesis on 7/6 with 3350cc pleural fluid removed. Per note from [facility], MD, patient transitioned to DNR; wants no aggressive treatment. Consult to palliative; PRN pain control. VSS. MEWS 1. No acute concerns at this time. Patient is in bed, NAD. No RRT interventions indicated at this time. Please call back if needed.      Soco Ibrahim RN  Ext. [phone]    Vitals w/ MEWS Score (last day)     Date/Time MEWS Score Pulse Resp Temp BP Level of Consciousness SpO2    07/07/20 2152    74  16  97.8 °F (36.6 °C)  132/74    98 %    07/07/20 2001              93 %    07/07/20 1952  1  70  16  97.6 °F (36.4 °C)  110/72  Alert  96 %    07/07/20 1448  1  73  18  96.9 °F (36.1 °C)  103/79  Alert  97 %    07/07/20 1113    81      106/63        07/07/20 1038  1  79  20  97.4 °F (36.3 °C)  116/80  Alert  97 %    07/07/20 0925    76  20    91/68    99 %    07/07/20 0920    78  20    93/68    98 %    07/07/20 0912    80      108/68        07/07/20 0909    82  20    110/70  Alert  94 %    07/07/20 0800              94 %    07/07/20 0710  2  76  18  97.4 °F (36.3 °C)  97/77  Alert  100 %    07/07/20 0651          99/67        07/07/20 0645          99/71        07/07/20 0642    77      93/53        07/07/20 0634          (!) 85/62        07/07/20 0633          (!) 81/61        07/07/20 0620    83      108/75        07/07/20 0613              97 %    07/07/20 0605  1  82  16  97.6 °F (36.4 °C)  116/80  Alert  94 %    07/07/20 0300  1  83  20  97.6 °F (36.4 °C)  139/83  Alert  97 %    07/06/20 2303  1  75  20  97.5 °F (36.4 °C)  103/66  Alert  95 %    07/06/20 2000              95 %    07/06/20 1914          109/72        07/06/20 1831  2  78  20  97.3 °F (36.3 °C)  96/63  Alert  93 %    07/06/20 1430 1  81  20  97.8 °F (36.6 °C)  116/82  Alert  95 %    07/06/20 1042    89      115/72        07/06/20 1032  1  90  20  97.5 °F (36.4 °C)  117/74  Alert  93 %    07/06/20 0920    87  20    110/57  Alert  98 %    07/06/20 0900    86  20    104/61  Alert  97 %    07/06/20 0850    80  20    101/62  Alert  98 %    07/06/20 0748  1  88  20  97.7 °F (36.5 °C)  115/78  Alert  93 %    07/06/20 0730              98 %    07/06/20 0253          118/69        07/06/20 0246  2  97  22  (!) 96.4 °F (35.8 °C)  (!) 171/103  Alert  97 %

## 2020-07-08 NOTE — PROGRESS NOTES
RAPID RESPONSE TEAM- Follow Up    Rounded on patient due to recent rapid response chest pain. Discussed with primary RN, Mitzi Mast. No acute concerns, VSS. Patient Vitals for the past 12 hrs:   Temp Pulse Resp BP SpO2   07/08/20 0817 97.9 °F (36.6 °C) 86 16 (!) 124/95 96 %          No RRT interventions indicated at this time. Please call with any questions or concerns.      Neisha Mingo  Rapid Response RN  [facility]

## 2020-07-08 NOTE — PROGRESS NOTES
DAISY: [place] ([facility])    8:45am- Pt's life partner Bert Blanco called CM via phone to inform CM that she will not be able to care for pt in the home. Pt's life partner inquired if pt is able to go to [facility]. CM informed pt's life partner that CM will inform pt's new CM have have CM to call her. CM will continue to follow patient for discharge planning needs and arrange for services as deemed necessary.     Louisa Melara [address] [place] [address]  [phone]

## 2020-07-08 NOTE — PROGRESS NOTES
Problem: Self Care Deficits Care Plan (Adult)  Goal: *Acute Goals and Plan of Care (Insert Text)  Description: FUNCTIONAL STATUS PRIOR TO ADMISSION: limited by SOB and was only walking short distances in the home, performed all ADLS on his own and wife performs IADLS, ambulated with SPC at times    [address]: The patient lived with wife to provide assist as needed. Occupational Therapy Goals:  Initiated 7/6/2020  1. Patient will perform grooming standing with rest breaks as needed with modified independence within 7 days. 2. Patient will perform toileting with modified independence within 7 days. 3. Patient will perform lower body dressing with modified independence within 7 days. 4. Patient will transfer from toilet with modified independence using the least restrictive device and appropriate durable medical equipment within 7 days. Outcome: Progressing Towards Goal   OCCUPATIONAL THERAPY TREATMENT  Patient: Mickey Ortiz (32 y.o. male)  Date: 7/8/2020  Diagnosis: Pneumonia [J18.9]  Lung mass [R91.8]   <principal problem not specified>       Precautions: DNR  Chart, occupational therapy assessment, plan of care, and goals were reviewed. ASSESSMENT  Patient continues with skilled OT services and is progressing towards goals. Demonstrates mildly decreased dynamic standing balance and limited endurance given medical condition. Pt has been anticoagulated for DVT earlier this date and reports resolution of all LUE p! Pt received supine in bed on arrival and agreeable to complete toileting tasks. Required SBA-CGA with brushing teeth and toileting with HHA to R hand for stability. Reports having to use his cane all the time recently, yet states it is currently broken. Pt is performing basic ADLs below his Mod I baseline and would benefit from short term rehab to maximize his safety with ADLs.      Current Level of Function Impacting Discharge (ADLs): SBA-CGA with standing ADLs; CGA with bathroom mobility and dynamic aspects of ADLs. Other factors to consider for discharge: Medical instability; Fall risk         PLAN :  Patient continues to benefit from skilled intervention to address the above impairments. Continue treatment per established plan of care. to address goals. Recommend with staff: CGA-SBA with bathroom mobility    Recommend next OT session: Item retrieval during ADLs; Self-monitoring need for rest breaks during simple ADL tasks. Recommendation for discharge: (in order for the patient to meet his/her long term goals)  Therapy up to 5 days/week in SNF setting     This discharge recommendation:   Has been made in collaboration with the attending provider and/or case management    IF patient discharges home will need the following DME: Anticipate none for OT       SUBJECTIVE:   Patient stated It feels better\" (re: LUE pain)    OBJECTIVE DATA SUMMARY:     Functional Mobility and Transfers for ADLs:  Bed Mobility:  Rolling: Supervision  Supine to Sit: Supervision  Scooting: Supervision    Transfers:  Sit to Stand: Contact guard assistance  Functional Transfers  Bathroom Mobility: Contact guard assistance  Toilet Transfer : Contact guard assistance  Bed to Chair: Contact guard assistance    Balance:  Sitting: Intact  Standing: Impaired  Standing - Static: Unsupported;Good  Standing - Dynamic : Fair    ADL Intervention:     Educated Pt re: importance of taking rest breaks during LB ADLs d/t observed SOB. Guided Pt through deep breathing techniques to maximize endurance during ADL task performance.      Grooming  Position Performed: Standing  Washing Hands: Supervision  Brushing Teeth: Stand-by assistance      Lower Body Dressing Assistance  Socks: Modified independent(increased time, rest breaks d/t SOB)  Leg Crossed Method Used: Yes  Position Performed: Seated edge of bed      Pain:  Reports resolution of LUE pain    Activity Tolerance:   Poor and requires rest breaks  Please refer to the flowsheet for vital signs taken during this treatment. After treatment patient left in no apparent distress:   Sitting in chair, Call bell within reach, and Bed / chair alarm activated    COMMUNICATION/COLLABORATION:   The patients plan of care was discussed with: Registered nurse and Patient .      Vero Glover, OTR/L  Time Calculation: 30 mins

## 2020-07-08 NOTE — PROGRESS NOTES
TRANSFER - OUT REPORT:    Verbal report given to Pushpa Vigil RN on [employer]  being transferred to [facility] for routine progression of care       Report consisted of patients Situation, Background, Assessment and   Recommendations(SBAR). Information from the following report(s) SBAR and Kardex was reviewed with the receiving nurse. Lines:   Peripheral IV 07/04/20 Right Antecubital (Active)   Site Assessment Clean, dry, & intact 7/7/2020  7:47 PM   Phlebitis Assessment 0 7/7/2020  7:47 PM   Infiltration Assessment 0 7/7/2020  7:47 PM   Dressing Status Clean, dry, & intact 7/7/2020  7:47 PM   Dressing Type Transparent;Tape 7/7/2020  7:47 PM   Hub Color/Line Status Flushed;Pink 7/7/2020  7:47 PM   Action Taken Open ports on tubing capped 7/7/2020  2:48 PM   Alcohol Cap Used Yes 7/7/2020  2:48 PM        Opportunity for questions and clarification was provided.       Patient transported with:   Registered Nurse

## 2020-07-08 NOTE — PROGRESS NOTES
Problem: Mobility Impaired (Adult and Pediatric)  Goal: *Acute Goals and Plan of Care (Insert Text)  Description: FUNCTIONAL STATUS PRIOR TO ADMISSION: Patient was ambulatory with cane for short household distances, reports he quickly becomes SOB. HOME SUPPORT PRIOR TO ADMISSION: Patient lives with wife, required some assistance with ADLs. Physical Therapy Goals  Initiated 7/6/2020  1. Patient will move from supine to sit and sit to supine  in bed with independence within 7 day(s). 2.  Patient will transfer from bed to chair and chair to bed with supervision/set-up using the least restrictive device within 7 day(s). 3.  Patient will perform sit to stand with supervision/set-up within 7 day(s). 4.  Patient will ambulate with minimal assistance/contact guard assist for 100 feet with the least restrictive device within 7 day(s). 5.  Patient will ascend/descend 7 stairs with  handrail(s) with supervision/set-up within 7 day(s). Outcome: Progressing Towards Goal   PHYSICAL THERAPY TREATMENT  Patient: Saul Loredo (35 y.o. male)  Date: 7/8/2020  Diagnosis: Pneumonia [J18.9]  Lung mass [R91.8]   <principal problem not specified>       Precautions: DNR  Chart, physical therapy assessment, plan of care and goals were reviewed. ASSESSMENT  Patient continues with skilled PT services and is progressing towards goals. Patient demonstrates the ability to ambulate increased distance this session. He demonstrates decreased balance, decreased gait speed and floor clearance and increased SOB with mobility. Patient is unable to speak while ambulating and requires several seconds to recover once seated. He requires at least Min A in order to ambulate without an AD. Patient also demonstrates increased swelling in the L arm which he reports is painful. He would greatly benefit from short term rehab stay in order to mobilize around his home.  At this time he reports he is unable to ambulate from the bed to the bathroom without assistance or becoming extremely short of breath. Current Level of Function Impacting Discharge (mobility/balance): Min A     Other factors to consider for discharge: medical stability, increased need for assistance, increased risk for falls         PLAN :  Patient continues to benefit from skilled intervention to address the above impairments. Continue treatment per established plan of care. to address goals. Recommendation for discharge: (in order for the patient to meet his/her long term goals)  Therapy up to 5 days/week in SNF setting    This discharge recommendation:  Has been made in collaboration with the attending provider and/or case management    IF patient discharges home will need the following DME: to be determined (TBD)       SUBJECTIVE:   Patient stated I can't make it four steps without being out of breath.     OBJECTIVE DATA SUMMARY:   Critical Behavior:  Neurologic State: Alert, Appropriate for age, Eyes open spontaneously  Orientation Level: Oriented X4  Cognition: Appropriate decision making, Appropriate for age attention/concentration, Appropriate safety awareness, Follows commands  Safety/Judgement: Awareness of environment, Fall prevention  Functional Mobility Training:  Bed Mobility:  Rolling: Supervision  Supine to Sit: Supervision     Scooting: Supervision        Transfers:  Sit to Stand: Contact guard assistance  Stand to Sit: Contact guard assistance                             Balance:  Sitting: Intact  Standing: Impaired  Standing - Static: Unsupported;Good  Standing - Dynamic : Fair  Ambulation/Gait Training:  Distance (ft): 10 Feet (ft)  Assistive Device: Gait belt  Ambulation - Level of Assistance: Minimal assistance        Gait Abnormalities: Decreased step clearance        Base of Support: Widened     Speed/Mary: Pace decreased (<100 feet/min)  Step Length: Right shortened;Left shortened                    Stairs:               Therapeutic Exercises: Pain Rating:      Activity Tolerance:   Poor, requires frequent rest breaks, and observed SOB with activity  Please refer to the flowsheet for vital signs taken during this treatment. After treatment patient left in no apparent distress:   Supine in bed, Call bell within reach, and Side rails x 3    COMMUNICATION/COLLABORATION:   The patients plan of care was discussed with: Occupational therapist, Registered nurse, and Case management.      Geo Cazares PT   Time Calculation: 30 mins

## 2020-07-08 NOTE — PROGRESS NOTES
DAISY:  1) SNF placement  2) Medicare authorization needed  3) Negative COVID needed  4) [facility] transport  5) 2nd IM letter needed    CM spoke with pt's partner Xu Floyd regarding referral sent to [facility] per her request. Awaiting response from facility to start insurance auth. DESIRAE asked Ms Trent Armstrong to update pt regarding change in disposition. She stated that we \"might have to give pt something to calm him down after because he'll be upset\". RN informed.      Ronnie Gee RN ,CM  [facility]  [phone]

## 2020-07-08 NOTE — PROGRESS NOTES
Bedside and Verbal shift change report given to [facility] (oncoming nurse) by Courtney Lu (offgoing nurse). Report included the following information Gregoria and MAR.

## 2020-07-08 NOTE — PROGRESS NOTES
CM attempted to call pt's son Deanna Ruby ([phone]) to update him regarding pt but line was busy. Mr Madelin Perez was added to pt's contacts in chart. DESIRAE will reach out again tomorrow.      Marielos Isabel RN ,  [facility]  [phone]

## 2020-07-08 NOTE — PROGRESS NOTES
Primary Nurse Otf Nuñez RN and JONAH Cevallos RN performed a dual skin assessment on this patient No impairment noted  Mack score is 16

## 2020-07-08 NOTE — PROGRESS NOTES
DAISY plan:    -  SNF placement - referral sent to [facility] per s/o request  -  Insurance authorization needed - [facility] initiating  -  Negative COVID needed - last done 7/8, pending  -  Medicaid is active / UAI needed - CM will complete  -  [facility] transport at d/c  -  2nd IMM letter needed    CM spoke with Anne-Marie Mancia at [facility] ([phone]) who stated they are willing to accept pending COVID test and insurance auth. Anne-Marie Mancia stated their facility will submit for insurance auth despite CM informing that [id number] [facility] is responsible for [employer]. CM faxed updated PT/OT notes to Anne-Marie Mancia to submit for auth (fx. [phone]). CM will follow-up tomorrow morning.      Lucina Montanez MSW  Care Manager  [phone]

## 2020-07-09 ENCOUNTER — HOSPICE ADMISSION (OUTPATIENT)
Dept: HOSPICE | Facility: HOSPICE | Age: 80
End: 2020-07-09

## 2020-07-09 LAB
SARS-COV-2, COV2: NOT DETECTED
SOURCE, COVRS: NORMAL
SPECIMEN SOURCE, FCOV2M: NORMAL

## 2020-07-09 PROCEDURE — 74011250636 HC RX REV CODE- 250/636: Performed by: INTERNAL MEDICINE

## 2020-07-09 PROCEDURE — 74011000250 HC RX REV CODE- 250: Performed by: INTERNAL MEDICINE

## 2020-07-09 PROCEDURE — 74011250637 HC RX REV CODE- 250/637: Performed by: INTERNAL MEDICINE

## 2020-07-09 PROCEDURE — 65270000029 HC RM PRIVATE

## 2020-07-09 PROCEDURE — 94640 AIRWAY INHALATION TREATMENT: CPT

## 2020-07-09 RX ADMIN — ACETAMINOPHEN 650 MG: 325 TABLET ORAL at 22:07

## 2020-07-09 RX ADMIN — ATENOLOL 25 MG: 25 TABLET ORAL at 09:38

## 2020-07-09 RX ADMIN — ENOXAPARIN SODIUM 90 MG: 100 INJECTION SUBCUTANEOUS at 22:06

## 2020-07-09 RX ADMIN — COLCHICINE 0.6 MG: 0.6 TABLET ORAL at 11:00

## 2020-07-09 RX ADMIN — FAMOTIDINE 20 MG: 10 INJECTION INTRAVENOUS at 22:07

## 2020-07-09 RX ADMIN — ENOXAPARIN SODIUM 90 MG: 100 INJECTION SUBCUTANEOUS at 09:39

## 2020-07-09 RX ADMIN — Medication 10 ML: at 14:00

## 2020-07-09 RX ADMIN — OXYCODONE 5 MG: 5 TABLET ORAL at 09:38

## 2020-07-09 RX ADMIN — FAMOTIDINE 20 MG: 10 INJECTION INTRAVENOUS at 09:38

## 2020-07-09 RX ADMIN — ALLOPURINOL 100 MG: 100 TABLET ORAL at 09:38

## 2020-07-09 RX ADMIN — Medication 10 ML: at 07:05

## 2020-07-09 RX ADMIN — ACETAMINOPHEN 650 MG: 325 TABLET ORAL at 07:04

## 2020-07-09 RX ADMIN — AMLODIPINE BESYLATE 10 MG: 5 TABLET ORAL at 09:38

## 2020-07-09 RX ADMIN — ARFORMOTEROL TARTRATE: 15 SOLUTION RESPIRATORY (INHALATION) at 20:33

## 2020-07-09 RX ADMIN — ARFORMOTEROL TARTRATE: 15 SOLUTION RESPIRATORY (INHALATION) at 07:22

## 2020-07-09 RX ADMIN — ALLOPURINOL 100 MG: 100 TABLET ORAL at 17:50

## 2020-07-09 RX ADMIN — LISINOPRIL 20 MG: 20 TABLET ORAL at 09:38

## 2020-07-09 RX ADMIN — ATORVASTATIN CALCIUM 10 MG: 10 TABLET, FILM COATED ORAL at 09:38

## 2020-07-09 RX ADMIN — Medication 10 ML: at 22:07

## 2020-07-09 NOTE — PROGRESS NOTES
Bedside and Verbal shift change bedside report given to [facility] (oncoming nurse) by Vandana Dotson (offgoing nurse). Report included the following information SBAR, Kardex, Intake/Output and MAR.

## 2020-07-09 NOTE — PROGRESS NOTES
Hospitalist Progress Note    NAME: Viktor Marcano   :  1940   MRN:  [id number]       Assessment / Plan:  Malignant pleural effusion status post thoracentesis improved still has some fluid there is going to issue with recurrent malignancy fluids. Newly diagnosed recent lung cancer clinical appear to be stage IV with malignant effusion prognosis poor with poor functional status I did discuss with oncology and family wants more information with oncology so oncology will be seen the patient. 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 Left-sided chest pain secondary to extensive DVT on anticoagulation. Anxiety depression continue Ativan patient is also very very hard of hearing. Surveillance carotid came back negative     / Body mass index is 24.61 kg/m². Code status: DNR  Prophylaxis: Weight-based Lovenox transition to Eliquis  Recommended Disposition: Facility     Subjective:     Chief Complaint / Reason for Physician Visit   Discussed with RN events overnight. Patient seen and examined at bedside very hard of hearing no events overnight. Going to nursing home    Review of Systems:  Symptom Y/N Comments  Symptom Y/N Comments   Fever/Chills n   Chest Pain n    Poor Appetite n   Edema n    Cough n   Abdominal Pain n    Sputum n   Joint Pain     SOB/HORN n   Pruritis/Rash     Nausea/vomit n   Tolerating PT/OT     Diarrhea    Tolerating Diet     Constipation    Other       Could NOT obtain due to:      Objective:     VITALS:   Last 24hrs VS reviewed since prior progress note.  Most recent are:  Patient Vitals for the past 24 hrs:   Temp Pulse Resp BP SpO2   20 0754 97.3 °F (36.3 °C) 74 16 107/80 95 %   07/09/20 0723     98 %   07/08/20 2245 98 °F (36.7 °C) 66 15 110/56 97 %   07/08/20 1959     93 %       Intake/Output Summary (Last 24 hours) at 7/9/2020 1500  Last data filed at 7/9/2020 0659  Gross per 24 hour   Intake 150 ml   Output 400 ml   Net -250 ml        PHYSICAL EXAM:  General: WD, WN. Alert, cooperative, no acute distress    EENT:  EOMI. Anicteric sclerae. MMM  Resp:  CTA bilaterally, no wheezing or rales. No accessory muscle use  CV:  Regular  rhythm,  No edema  GI:  Soft, Non distended, Non tender.  +Bowel sounds  Neurologic:  Alert and oriented X 3, normal speech,   Psych:   Good insight. Not anxious nor agitated  Skin:  No rashes. No jaundice    Reviewed most current lab test results and cultures  YES  Reviewed most current radiology test results   YES  Review and summation of old records today    NO  Reviewed patient's current orders and MAR    YES  PMH/ reviewed - no change compared to H&P  ________________________________________________________________________  Care Plan discussed with:    Comments   Patient x    Family      RN x    Care Manager     Consultant                        Multidiciplinary team rounds were held today with , nursing, pharmacist and clinical coordinator. Patient's plan of care was discussed; medications were reviewed and discharge planning was addressed. ________________________________________________________________________        Comments   >50% of visit spent in counseling and coordination of care     ________________________________________________________________________  Shon Jacinto MD     Procedures: see electronic medical records for all procedures/Xrays and details which were not copied into this note but were reviewed prior to creation of Plan. LABS:  I reviewed today's most current labs and imaging studies.   Pertinent labs include:  Recent Labs     07/07/20  0301   WBC 19.1*   HGB 12.3 HCT 40.0        Recent Labs     07/07/20  0301      K 4.7   *   CO2 20*   *   BUN 22*   CREA 0.94   CA 9.7       Signed: Rita Ceja MD

## 2020-07-09 NOTE — HOSPICE
[address] Daphne Dias RN note:  Consult noted, reviewing chart. Plan to meet partner Haseeb Madera at bedside if present. Will communicate with CM re disposition. Thank you for the opportunity to care for this pt and family. Please contact hospice at [phone] with any questions or concerns.

## 2020-07-09 NOTE — PROGRESS NOTES
DAISY plan:     -  SNF/LTC placement - accepted to [place] [id number] authorization - pending  -  Negative COVID needed - last done 7/8  -  Medicaid is active / UAI needed - CM will complete  -  [facility] transport at d/c  -  2nd IMM letter needed    2:39 PM - Discussion with [facility] liaison, Louise. Goals of care are unclear. Pt and family would benefit from Palliative Medicine to be re-consulted to further explore goals re: treatment vs comfort, advance care planning, and pain management. Louise will reach out to Palliative to discuss. 1:00 PM - Call received from pt's son, Malka Palacios ([phone]). Blossom Piña does NOT want pt to go to a nursing facility and instead would like to take pt home with him to [place], if pt would be able to manage this kind of travel. Blossom Piña requested return call from pt's attending. CM sent perfect sever to attending, Dr. Yves Goldmann, and requested him call son. Oncology consult may be beneficial do explore treatment options - this was also relayed to attending. 12:04 PM - Call received form pt's significant other, Joy Seip ([phone]). Grace asked if pt would be discharging with hospice care. Chart review provided no evidence that hospice was considered sine admission. CM spoke with attending, Dr. Yves Goldmann, who gave CM verbal permission to place hospice consult for info session. CM sent referral to [facility] to connect with pt and S/O for info session today. Plan remains for pt to transition to [facility] with or without hospice services. CM left voice message with [facility], Yosef Hamlin ([phone]), to request update on insurance auth. CM will continue to follow.       Silvino Torres, MSW  Care Manager  [phone]

## 2020-07-09 NOTE — PROGRESS NOTES
Bedside and Verbal shift change report given to Valentina Butterfield RN (oncoming nurse) by John Rosenberg RN (offgoing nurse). Report included the following information SBAR, Kardex, Intake/Output and MAR and events of the day.

## 2020-07-10 PROBLEM — C34.92 METASTATIC PRIMARY LUNG CANCER, LEFT (HCC): Status: ACTIVE | Noted: 2020-07-10

## 2020-07-10 PROCEDURE — 94640 AIRWAY INHALATION TREATMENT: CPT

## 2020-07-10 PROCEDURE — 74011250637 HC RX REV CODE- 250/637: Performed by: INTERNAL MEDICINE

## 2020-07-10 PROCEDURE — 74011000250 HC RX REV CODE- 250: Performed by: INTERNAL MEDICINE

## 2020-07-10 PROCEDURE — 74011250636 HC RX REV CODE- 250/636: Performed by: INTERNAL MEDICINE

## 2020-07-10 PROCEDURE — 94760 N-INVAS EAR/PLS OXIMETRY 1: CPT

## 2020-07-10 PROCEDURE — 65270000029 HC RM PRIVATE

## 2020-07-10 PROCEDURE — 74011250637 HC RX REV CODE- 250/637: Performed by: FAMILY MEDICINE

## 2020-07-10 RX ORDER — LANOLIN ALCOHOL/MO/W.PET/CERES
3 CREAM (GRAM) TOPICAL
Status: DISCONTINUED | OUTPATIENT
Start: 2020-07-10 | End: 2020-07-13 | Stop reason: HOSPADM

## 2020-07-10 RX ADMIN — ENOXAPARIN SODIUM 90 MG: 100 INJECTION SUBCUTANEOUS at 08:35

## 2020-07-10 RX ADMIN — OXYCODONE 5 MG: 5 TABLET ORAL at 22:22

## 2020-07-10 RX ADMIN — ARFORMOTEROL TARTRATE: 15 SOLUTION RESPIRATORY (INHALATION) at 19:26

## 2020-07-10 RX ADMIN — ALLOPURINOL 100 MG: 100 TABLET ORAL at 08:35

## 2020-07-10 RX ADMIN — FAMOTIDINE 20 MG: 10 INJECTION INTRAVENOUS at 08:35

## 2020-07-10 RX ADMIN — ATORVASTATIN CALCIUM 10 MG: 10 TABLET, FILM COATED ORAL at 08:35

## 2020-07-10 RX ADMIN — MELATONIN 3 MG: at 22:22

## 2020-07-10 RX ADMIN — ENOXAPARIN SODIUM 90 MG: 100 INJECTION SUBCUTANEOUS at 22:22

## 2020-07-10 RX ADMIN — Medication 10 ML: at 13:53

## 2020-07-10 RX ADMIN — LISINOPRIL 20 MG: 20 TABLET ORAL at 08:35

## 2020-07-10 RX ADMIN — ALLOPURINOL 100 MG: 100 TABLET ORAL at 18:06

## 2020-07-10 RX ADMIN — ARFORMOTEROL TARTRATE: 15 SOLUTION RESPIRATORY (INHALATION) at 07:15

## 2020-07-10 RX ADMIN — OXYCODONE 5 MG: 5 TABLET ORAL at 04:06

## 2020-07-10 RX ADMIN — COLCHICINE 0.6 MG: 0.6 TABLET ORAL at 10:12

## 2020-07-10 RX ADMIN — OXYCODONE 5 MG: 5 TABLET ORAL at 10:12

## 2020-07-10 RX ADMIN — Medication 10 ML: at 22:28

## 2020-07-10 RX ADMIN — MELATONIN 3 MG: at 01:07

## 2020-07-10 RX ADMIN — ATENOLOL 25 MG: 25 TABLET ORAL at 08:35

## 2020-07-10 RX ADMIN — AMLODIPINE BESYLATE 10 MG: 5 TABLET ORAL at 08:35

## 2020-07-10 NOTE — PROGRESS NOTES
Bedside shift change report given to Geovani (oncoming nurse) by Candy Hernandez (offgoing nurse). Report included the following information SBAR, Kardex, Intake/Output, MAR and Recent Results. Pt given tylenol x 1 and nisha x1 for L arm pain. Paged MD for something for sleep, gave melatonin but pt still did not sleep. Asking if wife is coming to see him.

## 2020-07-10 NOTE — HOSPICE
[facility]  [phone]     Patient Name: Luz Marina Fountain  YOB: 1940  Age: 79 y.o. Dameon Orourke RN Note:  Chart reviewed and discussed w/ ED [facility] liaison. Per notes it  Appears that patient wishes to go home w/ hospice services as well as medicaid caregivers. Attempted to reach Fernando Crowley ( life partner) to discuss hopsice services and D/C planning however telephone call was not answered and voicemail box is full. If patient would like hospice services, an agency that can provide care to patient's home will be needed as [employer] is outside of service area     Will attempt to reach at later date to assist with hospice info session    Thank you for the opportunity to be of service to this patient.

## 2020-07-10 NOTE — PROGRESS NOTES
1900-Bedside shift change report given to [facility] (oncoming nurse) by Balaji Crane (offgoing nurse). Report included the following information SBAR, Kardex, Procedure Summary, Intake/Output, MAR and Accordion.

## 2020-07-10 NOTE — CONSULTS
2001 [facility]  at [address] [place] [address] Ne, [address] [facility]  [phone]          Oncology Consultation Note        Patient: Anabell Ramirez MRN: [id number]  SSN: [id number]    YOB: 1940  Age: 79 y.o. Sex: male      Subjective:      Anabell Ramirez is a 79 y.o. male who I am seeing in consultation for a diagnosis of left sided lung carcinoma. He has been experiencing progressive weakness, dyspnea on exertion and weight loss. 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 He is admitted with the above symptoms. He is hard of hearing but is having a hard time caring for himself. Review of Systems:    Constitutional: weight loss  Eyes: negative  Ears, Nose, Mouth, Throat, and Face: negative  Respiratory: dyspnea  Cardiovascular: negative  Gastrointestinal: anorexia  Genitourinary:negative  Integument/Breast: negative  Hematologic/Lymphatic: negative  Musculoskeletal:negative  Neurological: generalized weakness        No past medical history on file. No past surgical history on file. No family history on file. Social History     Tobacco Use    Smoking status: Not on file   Substance Use Topics    Alcohol use: Not on file      Prior to Admission medications    Medication Sig Start Date End Date Taking? Authorizing Provider   polyethylene glycol (MIRALAX) 17 gram packet Take 1 Packet by mouth daily. 7/8/20  Yes Yo Adams MD   oxyCODONE IR (ROXICODONE) 5 mg immediate release tablet Take 1 Tab by mouth every four (4) hours as needed for Pain for up to 3 days.  Max Daily Amount: 30 mg. 7/8/20 7/11/20 Yes Jacquie Carrillo MD   apixaban (Eliquis DVT-PE Treat 30D Start) 5 mg (74 tabs) starter pack Take 10 mg (two 5 mg tablets) by mouth twice a day for 7 days   Followed by 5 mg (one 5 mg tablet) by mouth twice a day 7/8/20  Yes Yo Adams MD   levoFLOXacin (Levaquin) 250 mg tablet Take 2 Tabs by mouth daily. 7/8/20  Yes Lacretia MD Jose   albuterol (Ventolin HFA) 90 mcg/actuation inhaler Take 2 Puffs by inhalation every four (4) hours as needed. 9/6/19 9/5/20  Robbin, MD Roberto   allopurinoL (ZYLOPRIM) 100 mg tablet Take 100 mg by mouth two (2) times a day. 9/6/19   Robbin, MD Roberto   amLODIPine (NORVASC) 10 mg tablet TAKE 1 TABLET EVERY DAY 11/6/19   Roberto Siegel MD   aspirin-calcium carbonate 81 mg-300 mg calcium(777 mg) tab Take 81 mg by mouth daily. 4/17/17   Roberto Siegel MD   atenoloL-chlorthalidone (TENORETIC) 100-25 mg per tablet Take 1 Tab by mouth daily. 9/6/19 9/5/20  Roberto Siegel MD   atorvastatin (LIPITOR) 10 mg tablet Take 10 mg by mouth daily. 9/6/19   Robbin, MD Roberto   Bifidobacterium Infantis (Align) 4 mg cap Take 1 Cap by mouth. 5/29/20   Robbin, MD Roberto   budesonide-formoteroL (SYMBICORT) 160-4.5 mcg/actuation HFAA Take 2 Puffs by inhalation two (2) times a day. 5/22/20 5/22/21  Roberto Siegel MD   colchicine 0.6 mg tablet TAKE 1 TABLET EVERY DAY 11/6/19   Roberto Siegel MD   diclofenac EC (VOLTAREN) 75 mg EC tablet Take 75 mg by mouth two (2) times daily as needed. 6/12/20 8/11/20  Roberto Siegel MD   docusate sodium (COLACE) 100 mg capsule Take 100 mg by mouth two (2) times a day. 4/22/20 4/22/21  OtherRoberto MD   escitalopram oxalate (LEXAPRO) 10 mg tablet TAKE 1 TABLET EVERY DAY 11/6/19   Roberto Siegel MD   albuterol-ipratropium (DUO-NEB) 2.5 mg-0.5 mg/3 ml nebu Take 3 mL by inhalation. 5/22/20 5/22/21  OtherRoberto MD   lisinopriL (PRINIVIL, ZESTRIL) 20 mg tablet TAKE 1 TABLET EVERY DAY 11/6/19   Other, MD Roberto   nitroglycerin (NITROSTAT) 0.4 mg SL tablet DISSOLVE 1 TABLET UNDER THE TONGUE AS NEEDED 10/17/19   OtherRoberto MD   omega-3 acid ethyl esters (LOVAZA) 1 gram capsule Take 2 g by mouth two (2) times a day. 12/7/18   OtherRoberto MD   potassium chloride SA (MICRO-K) 10 mEq capsule Take 20 mEq by mouth daily.  9/6/19 9/5/20  OtherRoberto MD tadalafiL (Cialis) 10 mg tablet Take 10 mg by mouth as needed. 3/30/17   Other, MD Roberto   tiotropium (SPIRIVA) 18 mcg inhalation capsule Take 1 Cap by inhalation daily. 6/3/20 6/3/21  Other, MD Roberto              Allergies   Allergen Reactions    Bupropion Not Reported This Time           Objective:     Vitals:    07/09/20 2210 07/10/20 0716 07/10/20 0807 07/10/20 1541   BP: 138/87  127/78 114/75   Pulse: 79  95 78   Resp: 17  26 18   Temp: 97.7 °F (36.5 °C)  97.6 °F (36.4 °C) 97.8 °F (36.6 °C)   SpO2: 95% 92% 93% 93%   Weight:       Height:                Physical Exam:    GENERAL: alert, fatigued, cooperative, cachectic  EYE: conjunctivae/corneas clear. PERRL, EOM's intact  LYMPHATIC: Cervical, supraclavicular, and axillary nodes normal.   THROAT & NECK: normal and no erythema or exudates noted. LUNG: NO air entry in the left lung  HEART: regular rate and rhythm, S1, S2 normal, no murmur, click, rub or gallop  ABDOMEN: soft, non-tender. Bowel sounds normal. No masses,  no organomegaly  EXTREMITIES:  extremities normal, atraumatic, no cyanosis or edema  SKIN: Normal.  NEUROLOGIC: AOx3. Hard of hearing. Moving all 4 extremities. Lab Results   Component Value Date/Time    WBC 19.1 (H) 07/07/2020 03:01 AM    HGB 12.3 07/07/2020 03:01 AM    HCT 40.0 07/07/2020 03:01 AM    PLATELET 618 07/85/7580 03:01 AM    MCV 87.9 07/07/2020 03:01 AM       Lab Results   Component Value Date/Time    Sodium 139 07/07/2020 03:01 AM    Potassium 4.7 07/07/2020 03:01 AM    Chloride 110 (H) 07/07/2020 03:01 AM    CO2 20 (L) 07/07/2020 03:01 AM    Anion gap 9 07/07/2020 03:01 AM    Glucose 151 (H) 07/07/2020 03:01 AM    BUN 22 (H) 07/07/2020 03:01 AM    Creatinine 0.94 07/07/2020 03:01 AM    BUN/Creatinine ratio 23 (H) 07/07/2020 03:01 AM    GFR est AA >60 07/07/2020 03:01 AM    GFR est non-AA >60 07/07/2020 03:01 AM    Calcium 9.7 07/07/2020 03:01 AM    Bilirubin, total 0.7 07/06/2020 02:16 AM    Alk.  phosphatase 90 07/06/2020 02:16 AM    Protein, total 6.9 07/06/2020 02:16 AM    Albumin 2.0 (L) 07/06/2020 02:16 AM    Globulin 4.9 (H) 07/06/2020 02:16 AM    A-G Ratio 0.4 (L) 07/06/2020 02:16 AM    ALT (SGPT) 16 07/06/2020 02:16 AM    AST (SGOT) 19 07/06/2020 02:16 AM       CT Results (most recent):  Results from Hospital Encounter encounter on 07/04/20   CTA CHEST ABD PELV W CONT    Narrative EXAM:  CTA CHEST ABD PELV W CONT    INDICATION: SOB abd pain    COMPARISON: 5/20/2020    CONTRAST:  100 mL of Isovue-370. TECHNIQUE: CTA was performed through the chest abdomen and pelvis with MIP  reconstruction  Following the uneventful intravenous administration of contrast, thin axial  images were obtained through the chest, abdomen and pelvis. Coronal and sagittal  reconstructions were generated. Oral contrast was not administered. CT dose  reduction was achieved through use of a standardized protocol tailored for this  examination and automatic exposure control for dose modulation. FINDINGS:     THYROID: No nodule. MEDIASTINUM: There is mediastinal adenopathy in AP window. There is mediastinal  adenopathy medial to the left subclavian artery. SONDRA: No mass or lymphadenopathy. THORACIC AORTA: No dissection or aneurysm. MAIN PULMONARY ARTERY: Normal in caliber. No pulmonary emboli identified  although right lower lobe retractors are suboptimally visualized secondary to  respiratory motion. TRACHEA/BRONCHI: Patent. ESOPHAGUS: No wall thickening or dilatation. HEART: No displaced to the right  PLEURA: See below  LUNGS: There is a large mass in the left chest measuring 17 x 15 cm which  invades the mediastinum and narrows the left pulmonary artery and displaces the  mediastinum to the right. There is occlusion of the left pulmonary veins. Mass  appears larger than 5/20/2020. However there is also increasing pleural effusion  particularly at the left lung base. In the right lung, there are changes of emphysema.  There is slight basilar  atelectasis/tiny effusion. LIVER: Small low-density in the liver is unchanged. GALLBLADDER: There is suspicion of gallstones. SPLEEN: No mass. PANCREAS: No mass or ductal dilatation. ADRENALS: Unremarkable. KIDNEYS: No mass, calculus, or hydronephrosis. There are bilateral renal cysts. STOMACH: Unremarkable. SMALL BOWEL: No dilatation or wall thickening. COLON: No dilatation or wall thickening. APPENDIX: Not identified  PERITONEUM: No ascites or pneumoperitoneum. RETROPERITONEUM: No lymphadenopathy or aortic aneurysm. REPRODUCTIVE ORGANS: Prostate is normal  URINARY BLADDER: No mass or calculus. BONES: No destructive bone lesion. ADDITIONAL COMMENTS: There are multiple small radiopaque densities overlying  soft tissues to left pelvis  There is no answer in the radiology department        Impression IMPRESSION:    1. Again noted is a large mass in left chest which is increased slightly in size  since 5/20/2020 and invades the mediastinum and narrows the pulmonary artery on  the left and displaces the mediastinum to the right. There is nonfilling of the  left pulmonary veins. There is increasing left pleural effusion at the left lung base which is  inverting the diaphragm inferiorly and this potentially could be drained. .    There are changes of emphysema in the right lung with slight atelectasis at the  right lung base. No obvious pulmonary emboli identified although examination  limited by respiratory motion. CT of the abdomen and pelvis demonstrates no acute abnormality and no  significant change. Small low-density in the liver is stable. Results called to  the ER by myself. I personally reviewed the images. A very large mass in the left chest occupying a majority of the left lung. External records reviewed:    Pathology: Poorly differentiated sarcomatoid carcinoma        Assessment:     1.  Sarcomatoid carcinoma of the lung    Very large mass occupying a majority of the left lung.   His performance status is poor. He is not a candidate for systemic therapy. I discussed with him and his son. I recommended Hospice care. They are agreeable to that. I spent 65 minute with the patient in a face-to-face encounter. I explained him the stage of the disease, pathophysiology of the disease and the treatment approaches. I answered all his questions. More than 50% of the time was utilized in education, counseling and co-ordination of care. Plan:       1.  Hospice for end of life      Signed By: Kayli Wang MD     July 10, 2020

## 2020-07-10 NOTE — PROGRESS NOTES
ADULT PROTOCOL: JET AEROSOL ASSESSMENT    Patient  Deleta Promise     79 y.o.   male     7/10/2020  4:57 PM    Breath Sounds Pre Procedure: Right Breath Sounds: Diminished                               Left Breath Sounds: Diminished    Breath Sounds Post Procedure: Right Breath Sounds: Diminished                                 Left Breath Sounds: Diminished    Breathing pattern: Pre procedure Breathing Pattern: Regular          Post procedure Breathing Pattern: Regular    Heart Rate: Pre procedure Pulse: 52           Post procedure Pulse: 52    Resp Rate: Pre procedure Respirations: 20           Post procedure Respirations: 20    Cough: Pre procedure Cough: Non-productive               Post procedure Cough: Non-productive    Oxygen: O2 Device: Room air        Changed:    SpO2: Pre procedure SpO2: 92 %                 Post procedure SpO2: 92 %     Nebulizer Therapy: Current medications Aerosolized Medications: Brovana, Pulmicort      Changed: no    Problem List:   Patient Active Problem List   Diagnosis Code    Lung mass R91.8    Pneumonia J18.9       Respiratory Therapist: Justin York

## 2020-07-10 NOTE — PROGRESS NOTES
DAISY plan:     -  LTC placement vs move to [place] with son - possible hospice  -  Palliative consult to explore GOC, disposition, and ACP  -  Negative COVID needed - last done 7/8  -  Medicaid is active / UAI needed - CM will complete  -  [facility] transport at d/c  -  2nd IMM letter needed    5:49 PM - Dr. Blanca Sexton and Dr. Marquis Middleton have both seen and spoken with pt, Corinne Sandy (son). Goals are clear that pt will return home with hospice care and personal caregivers. All parties are in agreement with this plan. Referral sent to [email or web address] as they are providers in pt's home location. CM spoke with [facility] liaison, April Givens ([phone]), who stated she will call Lizzeth Tompkins and Conchita Meek today. CM cannot arrange for caregivers in the home until next week. Pt will need a UAI completed - CM will complete this on Monday and send to [facility] [id number] (only agency in the [address] [place] [address] area CM found that accepts Medicaid as payor). [place] contact info: ph - [phone], fx. [phone]. CM will follow-up on Monday morning. Weekend CM available as needed. *CM received records from PCP Dr. Lars Blanco office with pt's biopsy results. CM informed Dr. Blanca Sexton and placed on chart to be scanned. 2:42 PM - CM met with pt and life partner, Lizzeth Tompkins, at the bedside. Pt's wishes are clear - he would like to return home. Pt is alert and oriented. Grace's biggest concern to having pt return home is providing care. She is unable to provide the care pt requires and needs help. Pt has Medicaid therefore he should qualify for personal caregivers. CM called [facility] ([phone]) and left message for request return call. Pt will need a UAI in order to initiate caregiver services. Attending stated he will visit with pt and Lizzeth Tompkins soon. Awaiting Oncology to see. 12:04 PM - Palliative unable to meet with pt/life partner today. They can see on Monday if pt is still here.  Oncology consulted and will hopefully round on pt today to provide further insight into diagnosis/prognosis to aid in disposition planning. 11:18 AM - Life partner, Xu Floyd, will visit pt in hospital today around 1:00 pm. CM attempting to call Palliative to request they visit with pt and Grace around that time. Earlier this morning, DESIRAE informed Edson Ceballos that pt's son does not want pt to d/c to facility and prefers for pt to move in with him in [place]. Edson Ceballos was unaware of this - CM encouraged her to call pt's son to discuss and devise plan. CM will continue to follow.     César Garcia, MSW  Care Manager  [phone]

## 2020-07-10 NOTE — PROGRESS NOTES
OT Note:    Chart reviewed and spoke with . Possible plan for LTC placement or hospice. Will await further information and follow back Monday.     Thank you,    Nhung Drummond, OTR/L

## 2020-07-10 NOTE — PROGRESS NOTES
Possible plan for LTC placement or hospice.  Will await further information and follow back Monday.     Thank you,    Dustin Camarillo, PT

## 2020-07-10 NOTE — PROGRESS NOTES
Hospitalist Progress Note    NAME: Claudeen Horse   :  1940   MRN:  [id number]       Assessment / Plan:  Malignant pleural effusion patient is status post thoracentesis improving clinically stable not hypoxic no shortness of breath. Lung cancer appeared to be possible squamous cell or small cell carcinoma pathology was outpatient waiting for full report they said they sent it it was faxed but I have not seen it anywhere I looked through the fax machine. Nevertheless patient is already stage IV with pleural fluid involvement plan is comfort care hospice. I spoke with family in detail I did spoke to patient's significant other even though does not the wife but they live together for over 30 years. Son also in [place] I did talk to the son as well. Initially son wanted to take the patient to [place] but patient vehemently does not want to go anywhere he wants to go home and have peace. Case management working on getting patient home with hospice and that probably will be on Monday. I also spoke to oncology Dr. 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 Patient was on Lovenox but the plan is to transition to Eliquis for outpatient. So when patient is being discharged will need to be on Eliquis not Lovenox. Left-sided chest pain secondary to the clot no evidence of PE but the DVT was very extensive pain is controlled on pain medication. Surveillance COVID came back negative. Lactic acidosis resolved. I do not think this was pneumonia this all along is the malignancy is the mass from the beginning and I did not think there was any evidence suggestive of sepsis. I placed a call to patient's son on the phone had a lengthy discussion as well gave him updates of the plan of care and also what the patient wants. Currently patient is just waiting for discharge after all arrangement has been made. / Body mass index is 24.61 kg/m². Code status: DO NOT RESUSCITATE. Prophylaxis: On weight-based Lovenox which was transitioned to Eliquis at discharge. Recommended Disposition:  possible home with home hospice. Subjective:     Chief Complaint / Reason for Physician Visit  . Discussed with RN events overnight. Patient seen and examined at bedside no new complaints patient is extremely hard of hearing there has not been much changes with patient condition patient initially wanted to go to a facility for as hospice but today wife is here patient does not want to go anywhere wants to go home with home hospice so  is working and probably plan will be probably Monday. Review of Systems:  Symptom Y/N Comments  Symptom Y/N Comments   Fever/Chills n   Chest Pain n    Poor Appetite n   Edema n    Cough n   Abdominal Pain n    Sputum n   Joint Pain n    SOB/HORN n   Pruritis/Rash n    Nausea/vomit n   Tolerating PT/OT     Diarrhea n   Tolerating Diet     Constipation n   Other       Could NOT obtain due to:      Objective:     VITALS:   Last 24hrs VS reviewed since prior progress note. Most recent are:  Patient Vitals for the past 24 hrs:   Temp Pulse Resp BP SpO2   07/10/20 0807 97.6 °F (36.4 °C) 95 26 127/78 93 %   07/10/20 0716     92 %   07/09/20 2210 97.7 °F (36.5 °C) 79 17 138/87 95 %   07/09/20 2036     98 %       Intake/Output Summary (Last 24 hours) at 7/10/2020 1528  Last data filed at 7/10/2020 0406  Gross per 24 hour   Intake    Output 100 ml   Net -100 ml        PHYSICAL EXAM:  General: WD, WN. Alert, cooperative, no acute distress    EENT:  EOMI. Anicteric sclerae. MMM  Resp:  CTA bilaterally, no wheezing or rales.   No accessory muscle use  CV:  Regular  rhythm,  No edema  GI:  Soft, Non distended, Non tender.  +Bowel sounds  Neurologic:  Alert and oriented X 3, normal speech,   Psych:   Good insight. Not anxious nor agitated  Skin:  Left upper extremity edema    Reviewed most current lab test results and cultures  YES  Reviewed most current radiology test results   YES  Review and summation of old records today    NO  Reviewed patient's current orders and MAR    YES  PMH/SH reviewed - no change compared to H&P  ________________________________________________________________________  Care Plan discussed with:    Comments   Patient     Family      RN     Care Manager     Consultant                        Multidiciplinary team rounds were held today with , nursing, pharmacist and clinical coordinator. Patient's plan of care was discussed; medications were reviewed and discharge planning was addressed. ________________________________________________________________________            Comments   >50% of visit spent in counseling and coordination of care     ________________________________________________________________________  Sanjana Nunez MD     Procedures: see electronic medical records for all procedures/Xrays and details which were not copied into this note but were reviewed prior to creation of Plan. LABS:  I reviewed today's most current labs and imaging studies. Pertinent labs include:  No results for input(s): WBC, HGB, HCT, PLT, HGBEXT, HCTEXT, PLTEXT in the last 72 hours. No results for input(s): NA, K, CL, CO2, GLU, BUN, CREA, CA, MG, PHOS, ALB, TBIL, TBILI, ALT, INR, INREXT in the last 72 hours.     No lab exists for component: SGOT    Signed: Sanjana Nunez MD

## 2020-07-11 PROCEDURE — 74011250637 HC RX REV CODE- 250/637: Performed by: INTERNAL MEDICINE

## 2020-07-11 PROCEDURE — 74011250636 HC RX REV CODE- 250/636: Performed by: INTERNAL MEDICINE

## 2020-07-11 PROCEDURE — 94640 AIRWAY INHALATION TREATMENT: CPT

## 2020-07-11 PROCEDURE — 74011000250 HC RX REV CODE- 250: Performed by: INTERNAL MEDICINE

## 2020-07-11 PROCEDURE — 77010033678 HC OXYGEN DAILY

## 2020-07-11 PROCEDURE — 65270000029 HC RM PRIVATE

## 2020-07-11 PROCEDURE — 94760 N-INVAS EAR/PLS OXIMETRY 1: CPT

## 2020-07-11 RX ORDER — SENNOSIDES 8.6 MG/1
1 TABLET ORAL DAILY
Status: DISCONTINUED | OUTPATIENT
Start: 2020-07-11 | End: 2020-07-13 | Stop reason: HOSPADM

## 2020-07-11 RX ORDER — FACIAL-BODY WIPES
10 EACH TOPICAL DAILY PRN
Status: DISCONTINUED | OUTPATIENT
Start: 2020-07-11 | End: 2020-07-13 | Stop reason: HOSPADM

## 2020-07-11 RX ADMIN — ALLOPURINOL 100 MG: 100 TABLET ORAL at 08:00

## 2020-07-11 RX ADMIN — FAMOTIDINE 20 MG: 10 INJECTION INTRAVENOUS at 20:36

## 2020-07-11 RX ADMIN — ATENOLOL 25 MG: 25 TABLET ORAL at 08:00

## 2020-07-11 RX ADMIN — Medication 5 ML: at 21:11

## 2020-07-11 RX ADMIN — OXYCODONE 5 MG: 5 TABLET ORAL at 22:17

## 2020-07-11 RX ADMIN — OXYCODONE 5 MG: 5 TABLET ORAL at 13:33

## 2020-07-11 RX ADMIN — OXYCODONE 5 MG: 5 TABLET ORAL at 07:55

## 2020-07-11 RX ADMIN — LISINOPRIL 20 MG: 20 TABLET ORAL at 08:00

## 2020-07-11 RX ADMIN — SENNOSIDES 8.6 MG: 8.6 TABLET, FILM COATED ORAL at 10:48

## 2020-07-11 RX ADMIN — ENOXAPARIN SODIUM 90 MG: 100 INJECTION SUBCUTANEOUS at 08:00

## 2020-07-11 RX ADMIN — Medication 10 ML: at 13:33

## 2020-07-11 RX ADMIN — FAMOTIDINE 20 MG: 10 INJECTION INTRAVENOUS at 08:00

## 2020-07-11 RX ADMIN — AMLODIPINE BESYLATE 10 MG: 5 TABLET ORAL at 08:00

## 2020-07-11 RX ADMIN — ARFORMOTEROL TARTRATE: 15 SOLUTION RESPIRATORY (INHALATION) at 07:38

## 2020-07-11 RX ADMIN — ARFORMOTEROL TARTRATE: 15 SOLUTION RESPIRATORY (INHALATION) at 19:44

## 2020-07-11 RX ADMIN — ENOXAPARIN SODIUM 90 MG: 100 INJECTION SUBCUTANEOUS at 20:36

## 2020-07-11 RX ADMIN — ATORVASTATIN CALCIUM 10 MG: 10 TABLET, FILM COATED ORAL at 08:00

## 2020-07-11 RX ADMIN — COLCHICINE 0.6 MG: 0.6 TABLET ORAL at 08:00

## 2020-07-11 RX ADMIN — ALLOPURINOL 100 MG: 100 TABLET ORAL at 17:25

## 2020-07-11 NOTE — PROGRESS NOTES
Bedside shift change report given to Valentina Butterfield (oncoming nurse) by Alina Gutierrez (offgoing nurse). Report included the following information SBAR, Kardex, Procedure Summary, Intake/Output, MAR and Recent Results.

## 2020-07-11 NOTE — PROGRESS NOTES
Bedside and Verbal shift change report given to Geovani (oncoming nurse) by Woo Brock RN (offgoing nurse). Report included the following information Kardex, MAR and Recent Results.

## 2020-07-11 NOTE — PROGRESS NOTES
Hospitalist Progress Note    NAME: Renee Ramirez   :  1940   MRN:  [id number]       Assessment / Plan:  Malignant pleural effusion patient is status post thoracentesis improving clinically stable. 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 Nevertheless patient is already stage IV with pleural fluid involvement. Oncology notes reviewed, recommends hospice. plan is comfort care hospice. Dr. Amy Wheeler spoke with family in detail. patient's significant other even though does not the wife but they live together for over 27 years as well as the son living in [place]. Initially son wanted to take the patient to [place] but patient vehemently does not want to go anywhere he wants to go home and have peace. C  ase management working on getting patient home with hospice and that probably will be on Monday. Poor prognosis    Extensive left upper extremity deep venous thrombosis secondary to the malignancy. Patient was on Lovenox but the plan is to transition to Eliquis for outpatient. So when patient is being discharged will need to be on Eliquis not Lovenox. Left-sided chest pain secondary to the clot no evidence of PE but the DVT was very extensive pain is controlled on pain medication. Surveillance COVID came back negative. Lactic acidosis resolved. NO obvious pneumonia, ABX not given. I placed a call to patient's son on the phone had a lengthy discussion as well gave him updates of the plan of care and also what the patient wants. Currently patient is just waiting for discharge after all arrangement has been made. No labs as plan is for home with Hospice         / Body mass index is 24.61 kg/m². Code status: DO NOT RESUSCITATE. Prophylaxis:  On weight-based Lovenox which was transitioned to Eliquis at discharge. Recommended Disposition:  possible home with home hospice. Subjective:     Chief Complaint / Reason for Physician Visit  Patient seen and examined by the bedside, Labs, images and notes reviewed  Patient has not had BM for severeal days as per nurse and patient. Complains of abdominal pain and poor appetite. Will add Senna-s and Dulcolax suppository. Cont to complain of pain in the arm. But better with opiates. Discussed with nursing staff, orders reviewed. Review of Systems:  Described as per interval History, 12 point ROS is otherwise negative. Objective:     VITALS:   Last 24hrs VS reviewed since prior progress note. Most recent are:  Patient Vitals for the past 24 hrs:   Temp Pulse Resp BP SpO2   07/11/20 0741     96 %   07/11/20 0710 97.5 °F (36.4 °C) 83 18 122/88 96 %   07/10/20 2227 97.8 °F (36.6 °C) 85 20 112/62 92 %   07/10/20 2223 97.4 °F (36.3 °C) 76 17 118/66 93 %   07/10/20 1926     97 %   07/10/20 1541 97.8 °F (36.6 °C) 78 18 114/75 93 %     No intake or output data in the 24 hours ending 07/11/20 0833     PHYSICAL EXAM:  General: NAD, comfortable. very [place]    EENT:  EOMI. Anicteric sclerae.  MMM  Resp:  Decreased AE on the left, no wheezing  CV:  Regular  rhythm,  no M/R  GI:  Soft, Non distended, Non tender.  +Bowel sounds  Neurologic:  Moves all extremities  Psych:   Good insight. Not anxious nor agitated  Skin:  Left upper extremity edema    Reviewed most current lab test results and cultures  YES  Reviewed most current radiology test results   YES  Review and summation of old records today    NO  Reviewed patient's current orders and MAR    YES  PMH/SH reviewed - no change compared to H&P  ________________________________________________________________________  Care Plan discussed with:    Comments   Patient     Family      RN x    Care Manager     Consultant                        Multidiciplinary team rounds were held today with , nursing, pharmacist and clinical coordinator. Patient's plan of care was discussed; medications were reviewed and discharge planning was addressed. ________________________________________________________________________            Comments   >50% of visit spent in counseling and coordination of care     ________________________________________________________________________  Josiah Huggins MD     Procedures: see electronic medical records for all procedures/Xrays and details which were not copied into this note but were reviewed prior to creation of Plan. LABS:  I reviewed today's most current labs and imaging studies. Pertinent labs include:  No results for input(s): WBC, HGB, HCT, PLT, HGBEXT, HCTEXT, PLTEXT, HGBEXT, HCTEXT, PLTEXT in the last 72 hours. No results for input(s): NA, K, CL, CO2, GLU, BUN, CREA, CA, MG, PHOS, ALB, TBIL, TBILI, ALT, INR, INREXT, INREXT in the last 72 hours.     No lab exists for component: SGOT    Signed: Josiah Huggins MD

## 2020-07-11 NOTE — HOSPICE
[facility]  [phone]    Patient Name: Evelin Madera  YOB: 1940  Age: 79 y.o. [facility] RN Note:    Attempted to call Ange Stevens ( Life partner) for a hospice information session x 2 and was unable to leave a message due to voicemail box full. Patient is out of the [employer] service area, so family will need a referral for a hospice in their area. Thank you for the opportunity to be of service to this patient.

## 2020-07-11 NOTE — PROGRESS NOTES
ADULT PROTOCOL: JET AEROSOL  REASSESSMENT    Patient  Maria C Diane     79 y.o.   male     7/11/2020  10:13 AM    Breath Sounds Pre Procedure: Right Breath Sounds: Diminished                               Left Breath Sounds: Diminished    Breath Sounds Post Procedure: Right Breath Sounds: Diminished                                 Left Breath Sounds: Diminished    Breathing pattern: Pre procedure Breathing Pattern: Regular          Post procedure Breathing Pattern: Regular    Heart Rate: Pre procedure Pulse: 70           Post procedure Pulse: 70    Resp Rate: Pre procedure Respirations: 20           Post procedure Respirations: 20      Cough: Pre procedure Cough: Non-productive               Post procedure Cough: Non-productive      Oxygen: 2l/NC     Changed:     SpO2: Pre procedure SpO2: 96 %                 Post procedure SpO2: 96 %     Nebulizer Therapy: Current medications Aerosolized Medications: Brovana, Pulmicort      Changed: NO      Problem List:   Patient Active Problem List   Diagnosis Code    Lung mass R91.8    Pneumonia J18.9    Metastatic primary lung cancer, left [facility]) C34.92       Respiratory Therapist: Eunice Rodrigues RT

## 2020-07-12 LAB
ANION GAP SERPL CALC-SCNC: 8 MMOL/L (ref 5–15)
BUN SERPL-MCNC: 11 MG/DL (ref 6–20)
BUN/CREAT SERPL: 20 (ref 12–20)
CALCIUM SERPL-MCNC: 9 MG/DL (ref 8.5–10.1)
CHLORIDE SERPL-SCNC: 107 MMOL/L (ref 97–108)
CO2 SERPL-SCNC: 24 MMOL/L (ref 21–32)
CREAT SERPL-MCNC: 0.56 MG/DL (ref 0.7–1.3)
ERYTHROCYTE [DISTWIDTH] IN BLOOD BY AUTOMATED COUNT: 17.2 % (ref 11.5–14.5)
GLUCOSE SERPL-MCNC: 89 MG/DL (ref 65–100)
HCT VFR BLD AUTO: 36.4 % (ref 36.6–50.3)
HGB BLD-MCNC: 11.5 G/DL (ref 12.1–17)
MCH RBC QN AUTO: 27.3 PG (ref 26–34)
MCHC RBC AUTO-ENTMCNC: 31.6 G/DL (ref 30–36.5)
MCV RBC AUTO: 86.3 FL (ref 80–99)
NRBC # BLD: 0 K/UL (ref 0–0.01)
NRBC BLD-RTO: 0 PER 100 WBC
PLATELET # BLD AUTO: 155 K/UL (ref 150–400)
PMV BLD AUTO: 12.2 FL (ref 8.9–12.9)
POTASSIUM SERPL-SCNC: 3.5 MMOL/L (ref 3.5–5.1)
RBC # BLD AUTO: 4.22 M/UL (ref 4.1–5.7)
SODIUM SERPL-SCNC: 139 MMOL/L (ref 136–145)
WBC # BLD AUTO: 11.4 K/UL (ref 4.1–11.1)

## 2020-07-12 PROCEDURE — 85027 COMPLETE CBC AUTOMATED: CPT

## 2020-07-12 PROCEDURE — 74011250637 HC RX REV CODE- 250/637: Performed by: INTERNAL MEDICINE

## 2020-07-12 PROCEDURE — 74011250636 HC RX REV CODE- 250/636: Performed by: INTERNAL MEDICINE

## 2020-07-12 PROCEDURE — 74011250637 HC RX REV CODE- 250/637: Performed by: FAMILY MEDICINE

## 2020-07-12 PROCEDURE — 94640 AIRWAY INHALATION TREATMENT: CPT

## 2020-07-12 PROCEDURE — 94760 N-INVAS EAR/PLS OXIMETRY 1: CPT

## 2020-07-12 PROCEDURE — 77010033678 HC OXYGEN DAILY

## 2020-07-12 PROCEDURE — 36415 COLL VENOUS BLD VENIPUNCTURE: CPT

## 2020-07-12 PROCEDURE — 80048 BASIC METABOLIC PNL TOTAL CA: CPT

## 2020-07-12 PROCEDURE — 74011000250 HC RX REV CODE- 250: Performed by: INTERNAL MEDICINE

## 2020-07-12 PROCEDURE — 65270000029 HC RM PRIVATE

## 2020-07-12 RX ADMIN — ATORVASTATIN CALCIUM 10 MG: 10 TABLET, FILM COATED ORAL at 08:15

## 2020-07-12 RX ADMIN — MELATONIN 3 MG: at 22:07

## 2020-07-12 RX ADMIN — ALLOPURINOL 100 MG: 100 TABLET ORAL at 17:09

## 2020-07-12 RX ADMIN — Medication 10 ML: at 22:07

## 2020-07-12 RX ADMIN — Medication 5 ML: at 05:06

## 2020-07-12 RX ADMIN — LORAZEPAM 0.5 MG: 0.5 TABLET ORAL at 22:07

## 2020-07-12 RX ADMIN — ENOXAPARIN SODIUM 90 MG: 100 INJECTION SUBCUTANEOUS at 22:06

## 2020-07-12 RX ADMIN — Medication 10 ML: at 14:35

## 2020-07-12 RX ADMIN — ARFORMOTEROL TARTRATE: 15 SOLUTION RESPIRATORY (INHALATION) at 20:59

## 2020-07-12 RX ADMIN — OXYCODONE 5 MG: 5 TABLET ORAL at 06:59

## 2020-07-12 RX ADMIN — ATENOLOL 25 MG: 25 TABLET ORAL at 08:15

## 2020-07-12 RX ADMIN — OXYCODONE 5 MG: 5 TABLET ORAL at 17:09

## 2020-07-12 RX ADMIN — FAMOTIDINE 20 MG: 10 INJECTION INTRAVENOUS at 08:15

## 2020-07-12 RX ADMIN — COLCHICINE 0.6 MG: 0.6 TABLET ORAL at 08:15

## 2020-07-12 RX ADMIN — LISINOPRIL 20 MG: 20 TABLET ORAL at 08:15

## 2020-07-12 RX ADMIN — SENNOSIDES 8.6 MG: 8.6 TABLET, FILM COATED ORAL at 08:15

## 2020-07-12 RX ADMIN — ALLOPURINOL 100 MG: 100 TABLET ORAL at 08:15

## 2020-07-12 RX ADMIN — ENOXAPARIN SODIUM 90 MG: 100 INJECTION SUBCUTANEOUS at 08:15

## 2020-07-12 RX ADMIN — LORAZEPAM 0.5 MG: 0.5 TABLET ORAL at 15:10

## 2020-07-12 RX ADMIN — AMLODIPINE BESYLATE 10 MG: 5 TABLET ORAL at 08:15

## 2020-07-12 RX ADMIN — ARFORMOTEROL TARTRATE: 15 SOLUTION RESPIRATORY (INHALATION) at 07:59

## 2020-07-12 RX ADMIN — OXYCODONE 5 MG: 5 TABLET ORAL at 12:26

## 2020-07-12 RX ADMIN — FAMOTIDINE 20 MG: 10 INJECTION INTRAVENOUS at 22:06

## 2020-07-12 NOTE — HOSPICE
[facility]  [phone]     Patient Name: Roro Marti  YOB: 1940  Age: 79 y.o.  [address] RN Note:      Attempted to call Leigha Gracia for hospice information session. Edson Ceballos did not  to accomplish this goal. Patient continues to not meet inpatient criteria. It is also noted that patient lives out of service area. Family will need a referral for hospice in their area. Thank you for the opportunity to be of service to this patient.

## 2020-07-12 NOTE — PROGRESS NOTES
Hospitalist Progress Note    NAME: Chrystal Rasmussen   :  1940   MRN:  [id number]       Assessment / Plan:  Malignant pleural effusion, large  POA  patient is status post thoracentesis improving clinically stable. 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 Nevertheless patient is already stage IV with pleural fluid involvement. Oncology notes reviewed, recommends hospice. plan is [facility] hosp[facility]. Dr. Ramsey Cutler spoke with family in detail. patient's significant other even though does not the wife but they live together for over 27 years as well as the son living in [place]. Initially son wanted to take the patient to [place] but patient vehemently does not want to go anywhere he wants to go home and have peace. Case management working on getting patient home with hospice and that probably will be on Monday. Poor prognosis    Extensive left upper extremity deep venous thrombosis secondary to the malignancy. Patient was on Lovenox but the plan is to transition to Eliquis for outpatient. So when patient is being discharged will need to be on Eliquis not Lovenox. Left-sided chest pain secondary to the clot no evidence of PE but the DVT was very extensive pain is controlled on pain medication. Surveillance COVID came back negative. Lactic acidosis resolved. NO obvious pneumonia, ABX not given. Currently patient is just waiting for discharge after all arrangement has been made. No labs as plan is for home with Hospice       / Body mass index is 24.61 kg/m². Code status: DO NOT RESUSCITATE. Prophylaxis: On weight-based Lovenox which was transitioned to Eliquis at discharge. Recommended Disposition:  possible home with home hospice.      Subjective:     Chief Complaint / Reason for Physician Visit  Patient seen and examined by the bedside, Labs, images and notes reviewed  Doing well, had a small BM.  abd pain persists  Afebrile  No NV  Awaiting dispo on Hospice placement  DW NS, orders reviewed    Review of Systems:  Described as per interval History, 12 point ROS is otherwise negative. Objective:     VITALS:   Last 24hrs VS reviewed since prior progress note. Most recent are:  Patient Vitals for the past 24 hrs:   Temp Pulse Resp BP SpO2   07/12/20 0800     98 %   07/12/20 0756 97.6 °F (36.4 °C) 81 16 118/75 98 %   07/11/20 2252 97.7 °F (36.5 °C) 81 18 114/71 96 %   07/11/20 1945     99 %   07/11/20 1515 97.4 °F (36.3 °C) 70 18 100/56 94 %       Intake/Output Summary (Last 24 hours) at 7/12/2020 1584  Last data filed at 7/11/2020 2245  Gross per 24 hour   Intake    Output 200 ml   Net -200 ml        PHYSICAL EXAM:  General: NAD, comfortable. very [place], cachectic  EENT:  EOMI. Anicteric sclerae. MMM  Resp:  Decreased AE on the left, no wheezing  CV:  Regular  rhythm,  no M/R  GI:  Soft, Non distended, Non tender.  +Bowel sounds  Neurologic:  Moves all extremities  Psych:   Good insight. Not anxious nor agitated  Skin:  Left upper extremity edema    Reviewed most current lab test results and cultures  YES  Reviewed most current radiology test results   YES  Review and summation of old records today    NO  Reviewed patient's current orders and MAR    YES  PMH/SH reviewed - no change compared to H&P  ________________________________________________________________________  Care Plan discussed with:    Comments   Patient     Family      RN x    Care Manager     Consultant                        Multidiciplinary team rounds were held today with , nursing, pharmacist and clinical coordinator. Patient's plan of care was discussed; medications were reviewed and discharge planning was addressed.      ________________________________________________________________________            Comments   >50% of visit spent in counseling and coordination of care     ________________________________________________________________________  Markel Beltre MD     Procedures: see electronic medical records for all procedures/Xrays and details which were not copied into this note but were reviewed prior to creation of Plan. LABS:  I reviewed today's most current labs and imaging studies.   Pertinent labs include:  Recent Labs     07/12/20 0152   WBC 11.4*   HGB 11.5*   HCT 36.4*        Recent Labs     07/12/20 0152      K 3.5      CO2 24   GLU 89   BUN 11   CREA 0.56*   CA 9.0       Signed: Markel Beltre MD

## 2020-07-12 NOTE — PROGRESS NOTES
Bedside shift change report given to Vasiliy Wood (oncoming nurse) by Leroy Ross (offgoing nurse). Report included the following information SBAR, Kardex, Procedure Summary, MAR, Accordion and Recent Results. Hopeful for D/C tomorrow home on hospice pending acceptance.

## 2020-07-12 NOTE — PROGRESS NOTES
ADULT PROTOCOL: JET AEROSOL  REASSESSMENT    Patient  Gloria Daniels     79 y.o.   male     7/12/2020  9:09 AM    Breath Sounds Pre Procedure: Right Breath Sounds: Diminished                               Left Breath Sounds: Diminished    Breath Sounds Post Procedure: Right Breath Sounds: Diminished                                 Left Breath Sounds: Diminished    Breathing pattern: Pre procedure Breathing Pattern: Regular          Post procedure Breathing Pattern: Regular    Heart Rate: Pre procedure Pulse: 85           Post procedure Pulse: 77    Resp Rate: Pre procedure Respirations: 18           Post procedure Respirations: 18      Cough: Pre procedure Cough: Non-productive               Post procedure Cough: Non-productive      Oxygen: 2L/NC     Changed: NO    SpO2: Pre procedure SpO2: 98 %                 Post procedure SpO2: 98 %     Nebulizer Therapy: Current medications Aerosolized Medications: Brovana, Pulmicort      Changed: NO        Problem List:   Patient Active Problem List   Diagnosis Code    Lung mass R91.8    Pneumonia J18.9    Metastatic primary lung cancer, left [facility]) C34.92       Respiratory Therapist: Konstantin Andres RT

## 2020-07-13 VITALS
OXYGEN SATURATION: 95 % | HEIGHT: 74 IN | TEMPERATURE: 97.4 F | DIASTOLIC BLOOD PRESSURE: 65 MMHG | SYSTOLIC BLOOD PRESSURE: 106 MMHG | WEIGHT: 191.7 LBS | RESPIRATION RATE: 18 BRPM | HEART RATE: 78 BPM | BODY MASS INDEX: 24.6 KG/M2

## 2020-07-13 PROCEDURE — 77010033678 HC OXYGEN DAILY

## 2020-07-13 PROCEDURE — 74011250637 HC RX REV CODE- 250/637: Performed by: INTERNAL MEDICINE

## 2020-07-13 PROCEDURE — 74011250636 HC RX REV CODE- 250/636: Performed by: INTERNAL MEDICINE

## 2020-07-13 PROCEDURE — 94760 N-INVAS EAR/PLS OXIMETRY 1: CPT

## 2020-07-13 PROCEDURE — 74011000250 HC RX REV CODE- 250: Performed by: INTERNAL MEDICINE

## 2020-07-13 PROCEDURE — 94640 AIRWAY INHALATION TREATMENT: CPT

## 2020-07-13 RX ADMIN — ATENOLOL 25 MG: 25 TABLET ORAL at 09:11

## 2020-07-13 RX ADMIN — LISINOPRIL 20 MG: 20 TABLET ORAL at 09:11

## 2020-07-13 RX ADMIN — COLCHICINE 0.6 MG: 0.6 TABLET ORAL at 09:11

## 2020-07-13 RX ADMIN — OXYCODONE 5 MG: 5 TABLET ORAL at 15:28

## 2020-07-13 RX ADMIN — SENNOSIDES 8.6 MG: 8.6 TABLET, FILM COATED ORAL at 09:11

## 2020-07-13 RX ADMIN — ATORVASTATIN CALCIUM 10 MG: 10 TABLET, FILM COATED ORAL at 09:11

## 2020-07-13 RX ADMIN — FAMOTIDINE 20 MG: 10 INJECTION INTRAVENOUS at 09:10

## 2020-07-13 RX ADMIN — ALLOPURINOL 100 MG: 100 TABLET ORAL at 09:12

## 2020-07-13 RX ADMIN — ARFORMOTEROL TARTRATE: 15 SOLUTION RESPIRATORY (INHALATION) at 07:24

## 2020-07-13 RX ADMIN — AMLODIPINE BESYLATE 10 MG: 5 TABLET ORAL at 09:11

## 2020-07-13 RX ADMIN — OXYCODONE 5 MG: 5 TABLET ORAL at 09:12

## 2020-07-13 RX ADMIN — Medication 10 ML: at 07:39

## 2020-07-13 RX ADMIN — Medication 10 ML: at 13:30

## 2020-07-13 RX ADMIN — ENOXAPARIN SODIUM 90 MG: 100 INJECTION SUBCUTANEOUS at 09:10

## 2020-07-13 NOTE — PROGRESS NOTES
Bedside and Verbal shift change report given to Castillo Miller (oncoming nurse) by Levi Mast RN (offgoing nurse). Report included the following information SBAR, Kardex, Intake/Output, MAR, Accordion, Recent Results and Med Rec Status.

## 2020-07-13 NOTE — PROGRESS NOTES
DAISY plan:     -  Home with hospice and personal caregivers  -  Medicaid is active / [employer]AI completed 7/13/20  -  [facility] BLS transport at d/c  -  2nd IMM letter needed    2:48 PM - Confirmed with April at [facility] that DME being delivered to home within next hour or so. [facility] transport arranged for 3:00 pm pick-up. PCS, DDNR (completed by MD today), Facesheet, and H&P placed on chart for transport. [facility] Oxycodone fax[facility] to [facility] - they will fill and bring to pt. Rx's for Miralax and Eliquis faxed to pt's preferred pharmacy, [facility] [place] in [place] will pick-up today before they close at 6:00 pm. Additionally, DESIRAE spoke with Domenica Yuen at [employer] who stated she would call CM back tomorrow once she gets in the office. Per Domenica Yuen, it should typically take 2-3 days of processing before caregiver services can begin. Medicare pt has received, reviewed, and signed 2nd IM letter informing them of their right to appeal the discharge. Signed copy has been placed on pt bedside chart. No further concerns indicated at this time. AVS updated. Patient is ready for discharge from a Care Management standpoint. RN informed. 9:55 AM - DESIRAE met with Grace at bedside to discuss plan. Elvie Babin is comfortable with pt discharging home today. DESIARE explained that personal caregiver services will not start today but that [employer] will reach out to her to arrange start of care. Per discussion with bedside RN, pt is able to ambulate to the bathroom with supervision. Elvie Talya confirmed she is comfortable with meal preparation and administering medications. [facility] can provide a personal aide 3x per week until [employer] can begin services. DESIRAE spoke with Daisy Jean at [address] [place] [address] ([facility]) who confirmed they will order DME (hospital bed, etc.) today. CM will await confirmation that bed will be delivered prior to arranging medical transport.  Elvie Babin will be at the home to received DME.    8:44 AM - [facility] accepted for hospice services. CM completed and faxed [employer] to [id number] (personal caregiver agency). CM left message with [employer] ([phone]) requesting return call regarding start of care. CM will await return call.      TREVOR Macias  Care Manager  [phone]

## 2020-07-13 NOTE — PROGRESS NOTES
ADULT PROTOCOL: JET AEROSOL  REASSESSMENT    Patient  Larry Gray     79 y.o.   male     7/13/2020  5:47 AM    Breath Sounds Pre Procedure: Right Breath Sounds: Diminished                               Left Breath Sounds: Diminished    Breath Sounds Post Procedure: Right Breath Sounds: Diminished                                 Left Breath Sounds: Diminished    Breathing pattern: Pre procedure Breathing Pattern: Regular          Post procedure Breathing Pattern: Regular    Heart Rate: Pre procedure Pulse: 75           Post procedure Pulse: 78    Resp Rate: Pre procedure Respirations: 16           Post procedure Respirations: 16    Peak Flow: Pre bronchodilator   n/a          Post bronchodilator   n/a    Incentive Spirometry:   n/a      n/a    Cough: Pre procedure Cough: Non-productive               Post procedure Cough: Non-productive    Sputum: Pre procedure  n/a                 Post procedure  n/a    Oxygen: O2 Device: Nasal cannula   Flow rate (L/min) 2     Changed: NO    SpO2: Pre procedure SpO2: 93 %   with oxygen              Post procedure SpO2: 94 %  with oxygen    Nebulizer Therapy: Current medications Aerosolized Medications: Brovana, Pulmicort      Changed: NO    Problem List:   Patient Active Problem List   Diagnosis Code    Lung mass R91.8    Pneumonia J18.9    Metastatic primary lung cancer, left [facility]) C34.92       Respiratory Therapist: Sissy Dominguez RT

## 2020-07-13 NOTE — DISCHARGE SUMMARY
Hospitalist Discharge Summary     Patient ID:  Chrystal Rasmussen  [id number]  3 y.o.  1940  7/4/2020    PCP on record: Keo Ordonez date: 7/4/2020  Discharge date and time: 7/13/2020    DISCHARGE DIAGNOSIS:      Malignant pleural effusion patient is status post thoracentesis improving clinically stable not hypoxic no shortness of breath. Sarcomatoid carcinoma of the lung. Which is very large mass occupying the majority of the left lung patient performance status is poor therefore will not be at able to tolerate any form of therapy chemo. Lung cancer appeared to be possible squamous cell or small cell carcinoma pathology was outpatient waiting for full report they said they sent it it was faxed but I have not seen it anywhere I looked through the fax machine. Nevertheless patient is already stage IV with pleural fluid involvement plan is comfort care hospice. I spoke with family in detail I did spoke to patient's significant other even though does not the wife but they live together for over 30 years. Son also in [place] I did talk to the son as well. Initially son wanted to take the patient to [place] but patient vehemently does not want to go anywhere he wants to go home and have peace. Case management working on getting patient home with hospice and that probably will be on Monday. I also spoke to oncology Dr. Blanca Sexton for possible information visit     Extensive left upper extremity deep venous thrombosis secondary to the malignancy. Patient was on Lovenox but the plan is to transition to Eliquis for outpatient.   So when patient is being discharged will need to be on Eliquis not Lovenox.     Left-sided chest pain secondary to the clot no evidence of PE but the DVT was very extensive pain is controlled on pain medication.     Surveillance COVID came back negative.     Lactic acidosis resolved.     I do not think this was pneumonia this all along is the malignancy is the mass from the beginning and I did not think there was any evidence suggestive of sepsis.     I placed a call to patient's son on the phone had a lengthy discussion as well gave him updates of the plan of care and also what the patient wants.     Currently patient is just waiting for discharge after all arrangement has been made.         CONSULTATIONS:  IP CONSULT TO INTERVENTIONAL RADIOLOGY  IP CONSULT TO PALLIATIVE CARE - PROVIDER  IP CONSULT TO ONCOLOGY    Excerpted HPI from H&P of Hi Sarah MD:  79years old male with past medical history significant for emphysema, hypertension, coronary artery disease was transferred from \[facility]\"" ED for evaluation of worsening shortness of breath, patient was recently diagnosed with lung mass and had lung biopsy recently, patient stated his shortness of breath worsening and he feels shortness of breath with minimal exertion, patient denies any fever any chills denies any chest pain, blood work in ED was significant for elevated white blood cell count 13.0, lactic acid was noticed to be elevated 3.6, CTA chest was done and show an increase large left chest lung mass and increased left pleural effusion    ______________________________________________________________________  DISCHARGE SUMMARY/HOSPITAL COURSE:  for full details see H&P, daily progress notes, labs,   76-year-old who initially was admitted with large pleural effusion possible pneumonia. Prior to that patient had a mass in the lung had a biopsy as outpatient was supposed to follow-up at  [facility]. Patient treated with antibiotics for suspicion of pneumonia and also patient had thoracentesis unfortunately did not send cytology. Ultimately the center fax the report which is consistent with cancer lung cancer actually already stage IV.   Discussion was made in detail with family patient significant other and patient's son and they actually wanted hospice they did not want any aggressive therapy patient was also seen by oncology here and recommendation was hospice. When patient was about to be discharged they changed her mind did not want to go home initially wanted to go to a skilled facility. So at that time the discharge was canceled and patient was kept in the hospital.  We continued discussion with the significant other and patient himself as well as the father of members of the family which is the son who lives in [place]. Patient himself did not want to go to any facility wanted to go home peacefully also the son also wanted to take the patient to [place] but patient did not want that. So finally we discussed that patient could go home home hospice get some assistance at home to help with the the deck significant other who herself is not very healthy and not feeling well. So based on that patient is being discharged home as home hospice. Also on discharge patient had extremity left upper extremity extensive swollen ultrasound did show extensive deep venous thrombosis patient was started on anticoagulation was put and  Eliquis. Patient received Lovenox here and then transition to Eliquis. Plan of care discussed with patient and family answered all their questions appropriately patient is comfortable going home patient is a DO NOT RESUSCITATE.        _______________________________________________________________________  Patient seen and examined by me on discharge day. Pertinent Findings:  Gen:    Not in distress  Chest: Clear lungs  CVS:   Regular rhythm. No edema  Abd:  Soft, not distended, not tender  Neuro:  Alert,  _______________________________________________________________________  DISCHARGE MEDICATIONS:   Discharge Medication List as of 7/13/2020  1:51 PM      START taking these medications    Details   polyethylene glycol (MIRALAX) 17 gram packet Take 1 Packet by mouth daily. , Print, Disp-30 Packet,R-0      oxyCODONE IR (ROXICODONE) 5 mg immediate release tablet Take 1 Tab by mouth every four (4) hours as needed for Pain for up to 3 days. Max Daily Amount: 30 mg., Print, Disp-30 Tab,R-0      apixaban (Eliquis DVT-PE Treat 30D Start) 5 mg (74 tabs) starter pack Take 10 mg (two 5 mg tablets) by mouth twice a day for 7 days   Followed by 5 mg (one 5 mg tablet) by mouth twice a day, Print, Disp-1 Dose Pack,R-1      levoFLOXacin (Levaquin) 250 mg tablet Take 2 Tabs by mouth daily. , Print, Disp-10 Tab,R-0         CONTINUE these medications which have NOT CHANGED    Details   albuterol (Ventolin HFA) 90 mcg/actuation inhaler Take 2 Puffs by inhalation every four (4) hours as needed., Historical Med      allopurinoL (ZYLOPRIM) 100 mg tablet Take 100 mg by mouth two (2) times a day., Historical Med      amLODIPine (NORVASC) 10 mg tablet TAKE 1 TABLET EVERY DAY, Historical Med      aspirin-calcium carbonate 81 mg-300 mg calcium(777 mg) tab Take 81 mg by mouth daily. , Historical Med      atenoloL-chlorthalidone (TENORETIC) 100-25 mg per tablet Take 1 Tab by mouth daily. , Historical Med      atorvastatin (LIPITOR) 10 mg tablet Take 10 mg by mouth daily. , Historical Med      Bifidobacterium Infantis (Align) 4 mg cap Take 1 Cap by mouth., Historical Med      budesonide-formoteroL (SYMBICORT) 160-4.5 mcg/actuation HFAA Take 2 Puffs by inhalation two (2) times a day., Historical Med      colchicine 0.6 mg tablet TAKE 1 TABLET EVERY DAY, Historical Med      diclofenac EC (VOLTAREN) 75 mg EC tablet Take 75 mg by mouth two (2) times daily as needed., Historical Med      docusate sodium (COLACE) 100 mg capsule Take 100 mg by mouth two (2) times a day., Historical Med      escitalopram oxalate (LEXAPRO) 10 mg tablet TAKE 1 TABLET EVERY DAY, Historical Med      albuterol-ipratropium (DUO-NEB) 2.5 mg-0.5 mg/3 ml nebu Take 3 mL by inhalation. , Historical Med      lisinopriL (PRINIVIL, ZESTRIL) 20 mg tablet TAKE 1 TABLET EVERY DAY, Historical Med      nitroglycerin (NITROSTAT) 0.4 mg SL tablet DISSOLVE 1 TABLET UNDER THE TONGUE AS NEEDED, Historical Med      omega-3 acid ethyl esters (LOVAZA) 1 gram capsule Take 2 g by mouth two (2) times a day., Historical Med      potassium chloride SA (MICRO-K) 10 mEq capsule Take 20 mEq by mouth daily. , Historical Med      tadalafiL (Cialis) 10 mg tablet Take 10 mg by mouth as needed., Historical Med      tiotropium (SPIRIVA) 18 mcg inhalation capsule Take 1 Cap by inhalation daily. , Historical Med               Patient Follow Up Instructions:    Activity: as tolerated   Diet: pleasure feedings         Follow-up Information     Follow up With Specialties Details Why [address]    [phone]        ________________________________________________________________    Risk of deterioration: terminal     Condition at Discharge:  Stable  __________________________________________________________________    Disposition  Home Hospice     ____________________________________________________________________    Code Status: DNR  ___________________________________________________________________      Total time in minutes spent coordinating this discharge (includes going over instructions, follow-up, prescriptions, and preparing report for sign off to her PCP) :  35  minutes    Signed:  Irineo Dugan MD

## 2020-07-13 NOTE — PROGRESS NOTES
D/c instructions provided to pts caregiver, Radha Shane with time allowed to ask questions. Copy of instructions to pt at d/c. Per DESIRAE Morse, Hospice to fill Oxydocone prescription. Other prescriptions faxed to [facility] in [place], [place] per Grace's request.     @[id number] pt transported via [facility] home after report provided to transporters. Pts VSS, resp even and unlabored with pt Alert and oriented x 4 upon d/c. Pt states at d/c he had all of his belongings.

## 2022-10-29 NOTE — PROGRESS NOTES
Inform Dr. Ezra Bustillo of lactic acid at 3. No new orders given at this time.     Kristine Rosa RN [id number]